# Patient Record
Sex: FEMALE | Race: WHITE | Employment: OTHER | URBAN - METROPOLITAN AREA
[De-identification: names, ages, dates, MRNs, and addresses within clinical notes are randomized per-mention and may not be internally consistent; named-entity substitution may affect disease eponyms.]

---

## 2017-01-18 ENCOUNTER — HOSPITAL ENCOUNTER (INPATIENT)
Facility: HOSPITAL | Age: 82
LOS: 20 days | Discharge: NURSING, REHAB OR LONG-TERM CARE | DRG: 291 | End: 2017-02-07
Attending: EMERGENCY MEDICINE | Admitting: INTERNAL MEDICINE
Payer: MEDICARE

## 2017-01-18 ENCOUNTER — APPOINTMENT (OUTPATIENT)
Dept: GENERAL RADIOLOGY | Facility: HOSPITAL | Age: 82
DRG: 291 | End: 2017-01-18
Attending: EMERGENCY MEDICINE
Payer: MEDICARE

## 2017-01-18 DIAGNOSIS — R00.1 BRADYCARDIA: ICD-10-CM

## 2017-01-18 DIAGNOSIS — R09.02 HYPOXIA: ICD-10-CM

## 2017-01-18 DIAGNOSIS — I48.19 PERSISTENT ATRIAL FIBRILLATION (HCC): ICD-10-CM

## 2017-01-18 DIAGNOSIS — I50.9 ACUTE ON CHRONIC CONGESTIVE HEART FAILURE, UNSPECIFIED CONGESTIVE HEART FAILURE TYPE: Primary | ICD-10-CM

## 2017-01-18 PROBLEM — Z91.81 AT RISK FOR FALLING: Status: ACTIVE | Noted: 2017-01-18

## 2017-01-18 PROBLEM — E03.9 HYPOTHYROID: Chronic | Status: ACTIVE | Noted: 2017-01-18

## 2017-01-18 PROBLEM — I10 UNSPECIFIED ESSENTIAL HYPERTENSION: Chronic | Status: ACTIVE | Noted: 2017-01-18

## 2017-01-18 LAB
ALBUMIN SERPL-MCNC: 4.1 G/DL (ref 3.5–4.8)
ALP LIVER SERPL-CCNC: 95 U/L (ref 55–142)
ALT SERPL-CCNC: 20 U/L (ref 14–54)
APTT PPP: 32 SECONDS (ref 25–34)
AST SERPL-CCNC: 22 U/L (ref 15–41)
ATRIAL RATE: 51 BPM
BASOPHILS # BLD AUTO: 0.01 X10(3) UL (ref 0–0.1)
BASOPHILS NFR BLD AUTO: 0.1 %
BILIRUB SERPL-MCNC: 0.4 MG/DL (ref 0.1–2)
BILIRUB UR QL STRIP.AUTO: NEGATIVE
BUN BLD-MCNC: 65 MG/DL (ref 8–20)
CALCIUM BLD-MCNC: 10.2 MG/DL (ref 8.3–10.3)
CHLORIDE: 105 MMOL/L (ref 101–111)
CLARITY UR REFRACT.AUTO: CLEAR
CO2: 25 MMOL/L (ref 22–32)
COLOR UR AUTO: YELLOW
CREAT BLD-MCNC: 2.99 MG/DL (ref 0.55–1.02)
EOSINOPHIL # BLD AUTO: 0.03 X10(3) UL (ref 0–0.3)
EOSINOPHIL NFR BLD AUTO: 0.4 %
ERYTHROCYTE [DISTWIDTH] IN BLOOD BY AUTOMATED COUNT: 13.6 % (ref 11.5–16)
GLUCOSE BLD-MCNC: 135 MG/DL (ref 70–99)
GLUCOSE UR STRIP.AUTO-MCNC: NEGATIVE MG/DL
HCT VFR BLD AUTO: 46 % (ref 34–50)
HGB BLD-MCNC: 14.7 G/DL (ref 12–16)
HYALINE CASTS #/AREA URNS AUTO: PRESENT /LPF
IMMATURE GRANULOCYTE COUNT: 0.03 X10(3) UL (ref 0–1)
IMMATURE GRANULOCYTE RATIO %: 0.4 %
INR BLD: 0.9 (ref 0.89–1.12)
KETONES UR STRIP.AUTO-MCNC: NEGATIVE MG/DL
LEUKOCYTE ESTERASE UR QL STRIP.AUTO: NEGATIVE
LYMPHOCYTES # BLD AUTO: 0.74 X10(3) UL (ref 0.9–4)
LYMPHOCYTES NFR BLD AUTO: 10.2 %
M PROTEIN MFR SERPL ELPH: 8.1 G/DL (ref 6.1–8.3)
MCH RBC QN AUTO: 36.6 PG (ref 27–33.2)
MCHC RBC AUTO-ENTMCNC: 32 G/DL (ref 31–37)
MCV RBC AUTO: 114.4 FL (ref 81–100)
MONOCYTES # BLD AUTO: 0.42 X10(3) UL (ref 0.1–0.6)
MONOCYTES NFR BLD AUTO: 5.8 %
NEUTROPHIL ABS PRELIM: 6.02 X10 (3) UL (ref 1.3–6.7)
NEUTROPHILS # BLD AUTO: 6.02 X10(3) UL (ref 1.3–6.7)
NEUTROPHILS NFR BLD AUTO: 83.1 %
NITRITE UR QL STRIP.AUTO: NEGATIVE
P AXIS: 63 DEGREES
P-R INTERVAL: 206 MS
PH UR STRIP.AUTO: 5 [PH] (ref 4.5–8)
PLATELET # BLD AUTO: 210 10(3)UL (ref 150–450)
POTASSIUM SERPL-SCNC: 4.7 MMOL/L (ref 3.6–5.1)
PRO-BETA NATRIURETIC PEPTIDE: ABNORMAL PG/ML (ref ?–450)
PROT UR STRIP.AUTO-MCNC: 100 MG/DL
PSA SERPL DL<=0.01 NG/ML-MCNC: 12.4 SECONDS (ref 12.3–14.8)
Q-T INTERVAL: 432 MS
QRS DURATION: 98 MS
QTC CALCULATION (BEZET): 398 MS
R AXIS: -5 DEGREES
RBC # BLD AUTO: 4.02 X10(6)UL (ref 3.8–5.1)
RBC UR QL AUTO: NEGATIVE
RED CELL DISTRIBUTION WIDTH-SD: 58.7 FL (ref 35.1–46.3)
SODIUM SERPL-SCNC: 140 MMOL/L (ref 136–144)
SP GR UR STRIP.AUTO: 1.02 (ref 1–1.03)
T AXIS: 57 DEGREES
TROPONIN: <0.046 NG/ML (ref ?–0.05)
TSI SER-ACNC: 4.88 MIU/ML (ref 0.35–5.5)
UROBILINOGEN UR STRIP.AUTO-MCNC: <2 MG/DL
VENTRICULAR RATE: 51 BPM
WBC # BLD AUTO: 7.3 X10(3) UL (ref 4–13)

## 2017-01-18 PROCEDURE — 99223 1ST HOSP IP/OBS HIGH 75: CPT | Performed by: INTERNAL MEDICINE

## 2017-01-18 PROCEDURE — 71010 XR CHEST AP PORTABLE  (CPT=71010): CPT

## 2017-01-18 RX ORDER — LEVOTHYROXINE SODIUM 0.1 MG/1
100 TABLET ORAL NIGHTLY
Status: DISCONTINUED | OUTPATIENT
Start: 2017-01-18 | End: 2017-02-07

## 2017-01-18 RX ORDER — ATORVASTATIN CALCIUM 10 MG/1
10 TABLET, FILM COATED ORAL EVERY EVENING
Status: DISCONTINUED | OUTPATIENT
Start: 2017-01-18 | End: 2017-02-07

## 2017-01-18 RX ORDER — TORSEMIDE 20 MG/1
20 TABLET ORAL DAILY
Refills: 2 | Status: ON HOLD | COMMUNITY
Start: 2017-01-09 | End: 2017-02-07

## 2017-01-18 RX ORDER — SIMVASTATIN 20 MG
20 TABLET ORAL EVERY EVENING
COMMUNITY

## 2017-01-18 RX ORDER — PREDNISOLONE ACETATE 10 MG/ML
1 SUSPENSION/ DROPS OPHTHALMIC DAILY
Status: DISCONTINUED | OUTPATIENT
Start: 2017-01-18 | End: 2017-02-07

## 2017-01-18 RX ORDER — IRON ASPGLY/C/B12/FA/CA-TH/SUC 70-150-1MG
1 TABLET ORAL NIGHTLY
COMMUNITY
End: 2017-04-05 | Stop reason: ALTCHOICE

## 2017-01-18 RX ORDER — ATENOLOL 25 MG/1
25 TABLET ORAL EVERY EVENING
Status: DISCONTINUED | OUTPATIENT
Start: 2017-01-18 | End: 2017-01-19

## 2017-01-18 RX ORDER — FUROSEMIDE 10 MG/ML
20 INJECTION INTRAMUSCULAR; INTRAVENOUS ONCE
Status: DISCONTINUED | OUTPATIENT
Start: 2017-01-19 | End: 2017-01-19

## 2017-01-18 RX ORDER — TORSEMIDE 20 MG/1
20 TABLET ORAL DAILY
Status: DISCONTINUED | OUTPATIENT
Start: 2017-01-18 | End: 2017-01-19

## 2017-01-18 RX ORDER — IRON ASPGLY/C/B12/FA/CA-TH/SUC 70-150-1MG
1 TABLET ORAL 2 TIMES DAILY
Status: DISCONTINUED | OUTPATIENT
Start: 2017-01-18 | End: 2017-01-19

## 2017-01-18 RX ORDER — FUROSEMIDE 10 MG/ML
40 INJECTION INTRAMUSCULAR; INTRAVENOUS ONCE
Status: COMPLETED | OUTPATIENT
Start: 2017-01-18 | End: 2017-01-18

## 2017-01-18 RX ORDER — AMLODIPINE BESYLATE 5 MG/1
5 TABLET ORAL EVERY EVENING
Status: ON HOLD | COMMUNITY
End: 2017-02-02

## 2017-01-18 RX ORDER — HYDRALAZINE HYDROCHLORIDE 25 MG/1
25 TABLET, FILM COATED ORAL EVERY 8 HOURS SCHEDULED
Status: DISCONTINUED | OUTPATIENT
Start: 2017-01-18 | End: 2017-01-29

## 2017-01-18 RX ORDER — HYDRALAZINE HYDROCHLORIDE 20 MG/ML
10 INJECTION INTRAMUSCULAR; INTRAVENOUS EVERY 4 HOURS PRN
Status: DISCONTINUED | OUTPATIENT
Start: 2017-01-18 | End: 2017-02-07

## 2017-01-18 RX ORDER — HEPARIN SODIUM 5000 [USP'U]/ML
5000 INJECTION, SOLUTION INTRAVENOUS; SUBCUTANEOUS EVERY 12 HOURS
Status: DISCONTINUED | OUTPATIENT
Start: 2017-01-18 | End: 2017-01-21

## 2017-01-18 RX ORDER — AMLODIPINE BESYLATE 5 MG/1
5 TABLET ORAL EVERY EVENING
Status: DISCONTINUED | OUTPATIENT
Start: 2017-01-18 | End: 2017-01-29

## 2017-01-18 RX ORDER — ASCORBIC ACID, THIAMINE, RIBOFLAVIN, NIACINAMIDE, PYRIDOXINE, FOLIC ACID, COBALAMIN, BIOTIN, PANTOTHENIC ACID 100; 1.5; 1.7; 20; 10; 1; 6; 300; 1 MG/1; MG/1; MG/1; MG/1; MG/1; MG/1; UG/1; UG/1; MG/1
1 TABLET, COATED ORAL DAILY
Status: DISCONTINUED | OUTPATIENT
Start: 2017-01-19 | End: 2017-02-07

## 2017-01-18 RX ORDER — PREDNISOLONE ACETATE 10 MG/ML
1 SUSPENSION/ DROPS OPHTHALMIC DAILY
COMMUNITY

## 2017-01-18 RX ORDER — PANTOPRAZOLE SODIUM 40 MG/1
40 TABLET, DELAYED RELEASE ORAL
Status: DISCONTINUED | OUTPATIENT
Start: 2017-01-19 | End: 2017-02-07

## 2017-01-18 RX ORDER — ACETAMINOPHEN / DIPHENHYDRAMINE 25; 500 MG/1; MG/1
2 TABLET ORAL NIGHTLY
COMMUNITY

## 2017-01-18 RX ORDER — LEVOTHYROXINE SODIUM 0.1 MG/1
100 TABLET ORAL NIGHTLY
COMMUNITY
End: 2017-05-07

## 2017-01-18 NOTE — ED PROVIDER NOTES
Patient Seen in: [facility] Emergency Department    History   Patient presents with:  Dyspnea ALEXIS SOB (respiratory)  Poor Feed Anorexia (constitutional)    Stated Complaint: alexis. no appetite.      HPI    45-year-old female presents emergency room wit (NORVASC) 5 MG Oral Tab,  Take 1 tablet by mouth daily. Multiple Vitamins-Minerals (MULTI VITAMIN/MINERALS) Oral Tab,  Take  by mouth daily. acetaminophen (TYLENOL) 325 MG Oral Tab,  Take 650 mg by mouth every 6 (six) hours as needed.    polyethylene gl are moist, oropharynx is clear, uvula midline. Scalp is atraumatic. NECK: Neck is supple, there is no nuchal rigidity. No carotid bruits. No masses. Trachea midline. No cervical lymphadenopathy. HEART: Regular rate and rhythm, no murmurs.   LUNGS: R CBC W/ DIFFERENTIAL[[id number]]          Abnormal            Final result                 Please view results for these tests on the individual orders.    URINALYSIS WITH CULTURE REFLEX   RAINBOW DRAW BLUE   RAINBOW DRAW GOLD   RAINBOW DRAW LAVENDER   GIOVANNI

## 2017-01-18 NOTE — CONSULTS
[facility]  Report of Consultation    Marsha Tompkins Patient Status:  Inpatient    3/22/1920 MRN [id number]   [facility] 2NE-A Attending Brandee Becerra [facility] Day # 0 PCP Edouard Kahn MD     Reason for Consultation: ed facility-administered medications for this encounter. Review of Systems:  All systems were reviewed and are negative except as described above in HPI.     Physical Exam:  Blood pressure 191/68, pulse 51, temperature 97.9 °F (36.6 °C), temperature source sodium and fluid. Await echo to evaluate LVSF. Will start IV lasix and continue conservative medical management and comfort care in this frail and elderly lady.  Dr. Damaris Almonte has seen in the past. Hopefully she can tolerate IV diuretics without further incre

## 2017-01-18 NOTE — H&P
[facility]                                                               History & Physical         Elijah Yi Patient Status:  Emergency    3/22/1920 MRN [id number]   Location [address] [facility] Attending Anuj Irene DO APPENDECTOMY      HEMORRHOIDECTOMY      OTHER SURGICAL HISTORY      Comment KNEE REPLACEMENTS X2    OTHER SURGICAL HISTORY      Comment R ARM FRACTURE - METAL PLATE    OTHER SURGICAL HISTORY  1/2014    Comment R ankle fx       Family history:  Family Histo 01/18/2017   CO2 25.0 01/18/2017    01/18/2017   CA 10.2 01/18/2017   ALB 4.1 01/18/2017   ALKPHO 95 01/18/2017   BILT 0.4 01/18/2017   TP 8.1 01/18/2017   AST 22 01/18/2017   ALT 20 01/18/2017   PTT 32.0 01/18/2017   INR 0.90 01/18/2017   PTP 12.4

## 2017-01-18 NOTE — ED INITIAL ASSESSMENT (HPI)
Increasing in fatigue and lack of appetite for approximately two weeks, intermittent confusion, difficulty in breathing. Denies difficulty with urination. Denies productive cough. Denies fevers at home.

## 2017-01-19 ENCOUNTER — APPOINTMENT (OUTPATIENT)
Dept: ULTRASOUND IMAGING | Facility: HOSPITAL | Age: 82
DRG: 291 | End: 2017-01-19
Attending: INTERNAL MEDICINE
Payer: MEDICARE

## 2017-01-19 ENCOUNTER — APPOINTMENT (OUTPATIENT)
Dept: CV DIAGNOSTICS | Facility: HOSPITAL | Age: 82
DRG: 291 | End: 2017-01-19
Attending: INTERNAL MEDICINE
Payer: MEDICARE

## 2017-01-19 LAB
BASOPHILS # BLD AUTO: 0.02 X10(3) UL (ref 0–0.1)
BASOPHILS NFR BLD AUTO: 0.4 %
BUN BLD-MCNC: 68 MG/DL (ref 8–20)
CALCIUM BLD-MCNC: 9.8 MG/DL (ref 8.3–10.3)
CHLORIDE: 108 MMOL/L (ref 101–111)
CO2: 28 MMOL/L (ref 22–32)
CREAT BLD-MCNC: 2.79 MG/DL (ref 0.55–1.02)
CREAT UR-SCNC: 101 MG/DL
EOSINOPHIL # BLD AUTO: 0.05 X10(3) UL (ref 0–0.3)
EOSINOPHIL NFR BLD AUTO: 1 %
ERYTHROCYTE [DISTWIDTH] IN BLOOD BY AUTOMATED COUNT: 13.7 % (ref 11.5–16)
FOLATE (FOLIC ACID), SERUM: 85.3 NG/ML (ref 8.7–24)
GLUCOSE BLD-MCNC: 103 MG/DL (ref 70–99)
HAV AB SERPL IA-ACNC: 1267 PG/ML (ref 193–986)
HAV IGM SER QL: 2.7 MG/DL (ref 1.7–3)
HCT VFR BLD AUTO: 40.5 % (ref 34–50)
HGB BLD-MCNC: 12.6 G/DL (ref 12–16)
IMMATURE GRANULOCYTE COUNT: 0.02 X10(3) UL (ref 0–1)
IMMATURE GRANULOCYTE RATIO %: 0.4 %
LYMPHOCYTES # BLD AUTO: 0.5 X10(3) UL (ref 0.9–4)
LYMPHOCYTES NFR BLD AUTO: 10 %
MCH RBC QN AUTO: 36 PG (ref 27–33.2)
MCHC RBC AUTO-ENTMCNC: 31.1 G/DL (ref 31–37)
MCV RBC AUTO: 115.7 FL (ref 81–100)
MICROALBUMIN UR-MCNC: 30.2 MG/DL
MICROALBUMIN/CREAT 24H UR-RTO: 299 UG/MG (ref ?–30)
MONOCYTES # BLD AUTO: 0.42 X10(3) UL (ref 0.1–0.6)
MONOCYTES NFR BLD AUTO: 8.4 %
NEUTROPHIL ABS PRELIM: 4 X10 (3) UL (ref 1.3–6.7)
NEUTROPHILS # BLD AUTO: 4 X10(3) UL (ref 1.3–6.7)
NEUTROPHILS NFR BLD AUTO: 79.8 %
PLATELET # BLD AUTO: 178 10(3)UL (ref 150–450)
POTASSIUM SERPL-SCNC: 4.6 MMOL/L (ref 3.6–5.1)
RBC # BLD AUTO: 3.5 X10(6)UL (ref 3.8–5.1)
RED CELL DISTRIBUTION WIDTH-SD: 58.5 FL (ref 35.1–46.3)
SODIUM SERPL-SCNC: 143 MMOL/L (ref 136–144)
WBC # BLD AUTO: 5 X10(3) UL (ref 4–13)

## 2017-01-19 PROCEDURE — 99232 SBSQ HOSP IP/OBS MODERATE 35: CPT | Performed by: HOSPITALIST

## 2017-01-19 PROCEDURE — 93306 TTE W/DOPPLER COMPLETE: CPT

## 2017-01-19 PROCEDURE — 99222 1ST HOSP IP/OBS MODERATE 55: CPT | Performed by: INTERNAL MEDICINE

## 2017-01-19 PROCEDURE — 76770 US EXAM ABDO BACK WALL COMP: CPT

## 2017-01-19 PROCEDURE — 93306 TTE W/DOPPLER COMPLETE: CPT | Performed by: INTERNAL MEDICINE

## 2017-01-19 RX ORDER — IRON ASPGLY/C/B12/FA/CA-TH/SUC 70-150-1MG
1 TABLET ORAL NIGHTLY
Status: DISCONTINUED | OUTPATIENT
Start: 2017-01-20 | End: 2017-01-23

## 2017-01-19 RX ORDER — FUROSEMIDE 10 MG/ML
40 INJECTION INTRAMUSCULAR; INTRAVENOUS
Status: DISCONTINUED | OUTPATIENT
Start: 2017-01-19 | End: 2017-01-20

## 2017-01-19 RX ORDER — FUROSEMIDE 10 MG/ML
40 INJECTION INTRAMUSCULAR; INTRAVENOUS ONCE
Status: COMPLETED | OUTPATIENT
Start: 2017-01-19 | End: 2017-01-19

## 2017-01-19 NOTE — PLAN OF CARE
Problem: DISCHARGE PLANNING  Goal: Discharge to home or other facility with appropriate resources  INTERVENTIONS:  - Identify barriers to discharge w/pt and caregiver  - Include patient/family/discharge partner in discharge planning  - Arrange for needed d

## 2017-01-19 NOTE — CM/SW NOTE
01/19/17 1200   CM/SW Referral Data   Referral Source Physician;Nurse;Social Work (self-referral)   Reason for Referral Discharge planning   Informant Patient; Children  (Daughter/Caregiver/Ann Cormier)   Pertinent Medical Hx   Primary Care Physician Na

## 2017-01-19 NOTE — PROGRESS NOTES
[address] Cardiology Progress Note    Kaiser Flores Patient Status:  Inpatient    3/22/1920 MRN [id number]   [facility] 2NE-A Attending Alton Clark, [address] [facility] [address] Day # 1 PCP Kamila Pineda MD     Subjective:  She has (Porcine)  5,000 Units Subcutaneous Q12H   • hydrALAzine HCl  25 mg Oral Q8H [address]   • MULTIGEN FOLIC  1 tablet Oral BID       Physical Exam:  General:  Alert, in no apparent distress  Neck:  +9cm JVD  Cardiac:  S1, S2, regular  Lungs:   Diminished at the ba

## 2017-01-19 NOTE — DIETARY NOTE
Nutrition Short Note    Dietitian consult received for heart failure education. Pt asleep during attempted visits, no family at bedside, handout left at bedside. RD available PRN.     Analisa Mcintyre RD, LDN

## 2017-01-19 NOTE — PROGRESS NOTES
[facility]  Progress Note      Patient Status:  Inpatient    3/22/1920 MRN [id number]   [facility] 2NE-A Attending Raul Vo, [address] [facility] Day # 1 PCP Naveen Walsh MD     Chief Complaint: SOB/cough    S: Patient • Heparin Sodium (Porcine)  5,000 Units Subcutaneous Q12H   • hydrALAzine HCl  25 mg Oral Q8H [address]   • MULTIGEN FOLIC  1 tablet Oral BID       ASSESSMENT / PLAN:     1. Acute on chronic diastolic heart failure  1. Continue IV diuresis  2. Follow I/O's  3.

## 2017-01-19 NOTE — PROGRESS NOTES
01/19/17 0611 01/19/17 0613 01/19/17 0616   Vital Signs   Pulse (!) 48 (!) 49 (!) 47   Heart Rate Source Monitor --  --    Resp 20 --  --    Respiratory Quality Normal --  --    /47 mmHg 151/58 mmHg 151/58 mmHg   BP Location Left arm Left arm Left

## 2017-01-19 NOTE — CONSULTS
[facility]  Report of Consultation    Philbert Canavan Patient Status:  Inpatient    3/22/1920 MRN [id number]   [place] 2NE-A Attending Melvin Joyce, [address] Day # 1 PCP Peggy Rinaldi MD       Assessment / Plan:    1) CKD 4- l • Anemia    • Hypothyroid    • Visual impairment    • Hearing impairment    • Kidney stone    • Bacteremia    • Pneumonia      1 1/2 years ago. Hospitalized.     • CKD (chronic kidney disease)    • High cholesterol    • High blood pressure    • Disorder o mg, 10 mg, Intravenous, Q4H PRN  •  hydrALAzine HCl (APRESOLINE) tab 25 mg, 25 mg, Oral, Q8H [place]  •  MULTIGEN FOLIC -1-5 MG TABS 1 tablet, 1 tablet, Oral, BID    No current outpatient prescriptions on file.     Review of Systems:  Please see HPI for p to participate in the care of your patient.     Priyanka Zepeda  1/19/2017  7:54 AM

## 2017-01-19 NOTE — PROGRESS NOTES
01/19/17 0611 01/19/17 0613 01/19/17 0616   Vital Signs   Pulse (!) 48 (!) 49 (!) 47   Heart Rate Source Monitor --  --    Resp 20 --  --    Respiratory Quality Normal --  --    /47 mmHg 151/58 mmHg 135/48 mmHg   BP Location Left arm Left arm Left

## 2017-01-19 NOTE — PHYSICAL THERAPY NOTE
PHYSICAL THERAPY EVALUATION - INPATIENT     Room Number: 8009/7935-Y  Evaluation Date: 1/19/2017  Type of Evaluation: Initial  Physician Order: PT Eval and Treat    Presenting Problem: acute on chronic CHF  Reason for Therapy: Mobility Dysfunction and is able to dress herself, but dtr assists. Per dtr, pt ind with showering, has shower chair which pt does not use, has grab bars in shower. Dtr reports that they do exercises daily.     SUBJECTIVE  \"I feel better now\"     Patient self-stated goal is to g a railing?: A Little     AM-PAC Score:  Raw Score: 21   PT Approx Degree of Impairment Score: 28.97%   Standardized Score (AM-PAC Scale): 50.25   CMS Modifier (G-Code): CJ    FUNCTIONAL ABILITY STATUS  Gait Assessment   Gait Assistance: Minimum assistance RECOMMENDATIONS  PT Discharge Recommendations: Home (with supportive daughter)    PLAN  PT Treatment Plan: Balance training;Transfer training;Gait training;Strengthening; Endurance; Energy conservation  Rehab Potential : Good  Frequency (Obs): 5x/week  [place]

## 2017-01-19 NOTE — PROGRESS NOTES
CARDIOVASCULAR - ADULT      •  Maintains optimal cardiac output and hemodynamic stability  Progressing      •  Absence of cardiac arrhythmias or at baseline  Progressing            Impaired Activities of Daily Living      •  Achieve highest/safest level of

## 2017-01-19 NOTE — PROGRESS NOTES
Rec'd patient at 1600, Alert and oriented x2-3. Daughter at bedside. Updated on plan of care. Up with one assist and walker, no home O2, uses 2L NC and C/O SOB with activity. Bilateral lower ext edema L>R. US of kidney and bladder done.

## 2017-01-20 LAB
ALLENS TEST: POSITIVE
AMMONIA: 25 UMOL/L (ref 11–32)
ARTERIAL BLD GAS O2 SATURATION: 87 % (ref 92–100)
ARTERIAL BLOOD GAS BASE EXCESS: -1.5
ARTERIAL BLOOD GAS HCO3: 25.8 MEQ/L (ref 22–26)
ARTERIAL BLOOD GAS PCO2: 53 MM HG (ref 35–45)
ARTERIAL BLOOD GAS PH: 7.3 (ref 7.35–7.45)
ARTERIAL BLOOD GAS PO2: 50 MM HG (ref 80–105)
BUN BLD-MCNC: 74 MG/DL (ref 8–20)
CALCIUM BLD-MCNC: 9.8 MG/DL (ref 8.3–10.3)
CALCULATED O2 SATURATION: 81 % (ref 92–100)
CARBOXYHEMOGLOBIN: 1 % SAT (ref 0–3)
CHLORIDE: 106 MMOL/L (ref 101–111)
CO2: 23 MMOL/L (ref 22–32)
CREAT BLD-MCNC: 3.02 MG/DL (ref 0.55–1.02)
GLUCOSE BLD-MCNC: 117 MG/DL (ref 70–99)
L/M: 4 L/MIN
METHEMOGLOBIN: 0.6 % SAT (ref 0.4–1.5)
PATIENT TEMPERATURE: 97.6 F
POTASSIUM SERPL-SCNC: 5.1 MMOL/L (ref 3.6–5.1)
PROCALCITONIN SERPL-MCNC: <0.11 NG/ML (ref ?–0.11)
SODIUM SERPL-SCNC: 142 MMOL/L (ref 136–144)
TOTAL HEMOGLOBIN: 13.6 G/DL (ref 11.7–16)

## 2017-01-20 PROCEDURE — 5A09557 ASSISTANCE WITH RESPIRATORY VENTILATION, GREATER THAN 96 CONSECUTIVE HOURS, CONTINUOUS POSITIVE AIRWAY PRESSURE: ICD-10-PCS | Performed by: INTERNAL MEDICINE

## 2017-01-20 PROCEDURE — 99232 SBSQ HOSP IP/OBS MODERATE 35: CPT | Performed by: HOSPITALIST

## 2017-01-20 PROCEDURE — 99232 SBSQ HOSP IP/OBS MODERATE 35: CPT | Performed by: INTERNAL MEDICINE

## 2017-01-20 RX ORDER — FUROSEMIDE 10 MG/ML
40 INJECTION INTRAMUSCULAR; INTRAVENOUS
Status: DISCONTINUED | OUTPATIENT
Start: 2017-01-20 | End: 2017-01-22

## 2017-01-20 NOTE — PROGRESS NOTES
[address] Cardiology Progress Note    Yolette Hickey Patient Status:  Inpatient    3/22/1920 MRN [id number]   [facility] 2NE-A Attending Lester Cortez, [address] [facility] [address] Day # 2 PCP Omer Mendoza MD     Subjective:  She was Subcutaneous Q12H   • hydrALAzine HCl  25 mg Oral Q8H [address]     Echo 1/20/17:  Conclusions:  1.  Left ventricle:  There was sinus bradycardia with rates varying from 33 -      60 bpm. The cavity size was normal. Wall thickness was normal. Systolic      functi Hg.  11. Pericardium, extracardiac: There was a left pleural effusion.   Impressions:  This study is compared with previous dated 06/10/2013:  Compared to the previous study, resting heart rate has changed from normal  sinus at ~ 72 bpm to sinus bradycardia

## 2017-01-20 NOTE — CONSULTS
Michael Wolfe [address] [facility] [address] Note [facility]  Report of Consultation    Elijah Yi Patient Status:  Inpatient    3/22/1920 MRN [id number]   [facility] 2NE-A Attending Isidra Ramirez furosemide  40 mg Intravenous BID (Diuretic)   • MULTIGEN FOLIC  1 tablet Oral Nightly   • AmLODIPine Besylate  5 mg Oral QPM   • Levothyroxine Sodium  100 mcg Oral Nightly   • multivitamin  1 tablet Oral Daily   • Pantoprazole Sodium  40 mg Oral QAM AC (36.6 °C) Oral 63 18 (!) 88 % -   01/19/17 2137 153/42 mmHg 98 °F (36.7 °C) Oral 53 18 92 % -         Intake/Output:  [unfilled]  @IOTHIS SHIFT@         PHYSICAL EXAM  GEN: Appears comfrotable.  No acute distress  PSYCH: Normal mood and affect, AAOX3  N result)   Collection Time: 01/18/17  3:49 PM   Result Value Ref Range   Blood Culture Result No Growth 2 Days N/A     Recent Labs   Lab  01/18/17   1611   COLORUR  Yellow   CLARITY  Clear   SPECGRAVITY  1.017   GLUUR  Negative   BILUR  Negative   KETUR  Ne

## 2017-01-20 NOTE — PROGRESS NOTES
[facility]  Progress Note     Lindsay Nicholson Patient Status:  Inpatient    3/22/1920 MRN [id number]   [facility] 2NE-A Attending Bushra Chua, [address] Day # 2 PCP Ha Reeder MD     Chief Complaint: SOB/cough    S: Patient Daily   • Pantoprazole Sodium  40 mg Oral QAM AC   • Atorvastatin Calcium  10 mg Oral QPM   • prednisoLONE acetate  1 drop Right Eye Daily   • Heparin Sodium (Porcine)  5,000 Units Subcutaneous Q12H   • hydrALAzine HCl  25 mg Oral Q8H [address]       ASSESSMENT

## 2017-01-20 NOTE — PROGRESS NOTES
[facility]  Nephrology Progress Note    Joslyn Luna Attending:  Naty Cardenas,        Assessment and Plan:  1) CKD 4- longstanding, presumably due to hypertensive and age-related nephrosclerosis with baseline Cr approx 2.5 mg/dl x yrs- relative 9.8 01/20/2017       Imaging: All imaging studies reviewed.     Meds:     Current Facility-Administered Medications:  MULTIGEN FOLIC -6-6 MG TABS 1 tablet 1 tablet Oral Nightly   influenza vaccine (PF)(FLUZONE) high dose for 65 yrs & older nj 0.5ml 0

## 2017-01-21 ENCOUNTER — APPOINTMENT (OUTPATIENT)
Dept: GENERAL RADIOLOGY | Facility: HOSPITAL | Age: 82
DRG: 291 | End: 2017-01-21
Attending: HOSPITALIST
Payer: MEDICARE

## 2017-01-21 LAB
ALLENS TEST: POSITIVE
ARTERIAL BLD GAS O2 SATURATION: 94 % (ref 92–100)
ARTERIAL BLOOD GAS BASE EXCESS: -0.2
ARTERIAL BLOOD GAS HCO3: 26.2 MEQ/L (ref 22–26)
ARTERIAL BLOOD GAS PCO2: 50 MM HG (ref 35–45)
ARTERIAL BLOOD GAS PH: 7.34 (ref 7.35–7.45)
ARTERIAL BLOOD GAS PO2: 79 MM HG (ref 80–105)
BILIRUB UR QL STRIP.AUTO: NEGATIVE
BUN BLD-MCNC: 80 MG/DL (ref 8–20)
CALCIUM BLD-MCNC: 9.8 MG/DL (ref 8.3–10.3)
CALCULATED O2 SATURATION: 95 % (ref 92–100)
CARBOXYHEMOGLOBIN: 0.9 % SAT (ref 0–3)
CHLORIDE: 106 MMOL/L (ref 101–111)
CLARITY UR REFRACT.AUTO: CLEAR
CO2: 28 MMOL/L (ref 22–32)
COLOR UR AUTO: YELLOW
CREAT BLD-MCNC: 3.09 MG/DL (ref 0.55–1.02)
EXPIRATORY PRESSURE: 5 CM H2O
FIO2: 45 %
GLUCOSE BLD-MCNC: 94 MG/DL (ref 70–99)
GLUCOSE UR STRIP.AUTO-MCNC: NEGATIVE MG/DL
HAV IGM SER QL: 2.7 MG/DL (ref 1.7–3)
HYALINE CASTS #/AREA URNS AUTO: PRESENT /LPF
INSP PRESSURE: 10 CM H2O
KETONES UR STRIP.AUTO-MCNC: NEGATIVE MG/DL
METHEMOGLOBIN: 0.6 % SAT (ref 0.4–1.5)
NITRITE UR QL STRIP.AUTO: NEGATIVE
PATIENT TEMPERATURE: 98.6 F
PH UR STRIP.AUTO: 5 [PH] (ref 4.5–8)
POTASSIUM SERPL-SCNC: 4.8 MMOL/L (ref 3.6–5.1)
PROT UR STRIP.AUTO-MCNC: 30 MG/DL
RBC UR QL AUTO: NEGATIVE
SODIUM SERPL-SCNC: 141 MMOL/L (ref 136–144)
SP GR UR STRIP.AUTO: 1.02 (ref 1–1.03)
TOTAL HEMOGLOBIN: 12.5 G/DL (ref 11.7–16)
UROBILINOGEN UR STRIP.AUTO-MCNC: <2 MG/DL

## 2017-01-21 PROCEDURE — 99232 SBSQ HOSP IP/OBS MODERATE 35: CPT | Performed by: HOSPITALIST

## 2017-01-21 PROCEDURE — 71010 XR CHEST AP PORTABLE  (CPT=71010): CPT

## 2017-01-21 PROCEDURE — 99232 SBSQ HOSP IP/OBS MODERATE 35: CPT | Performed by: INTERNAL MEDICINE

## 2017-01-21 RX ORDER — HEPARIN SODIUM 5000 [USP'U]/ML
5000 INJECTION, SOLUTION INTRAVENOUS; SUBCUTANEOUS EVERY 12 HOURS
Status: DISCONTINUED | OUTPATIENT
Start: 2017-01-21 | End: 2017-02-03

## 2017-01-21 NOTE — PROGRESS NOTES
[facility]  Progress Note    Julisa Atkins Patient Status:  Inpatient    3/22/1920 MRN [id number]   [facility] 2NE-A Attending Charmaine Kaur, [address] [facility] Day # 3 PCP Giles Reed MD     Subjective:  Julisa Atkins is a(n) 80 yea 2364  01/21/17   0514   GLU  103*  117*  94   BUN  68*  74*  80*   CREATSERUM  2.79*  3.02*  3.09*   CA  9.8  9.8  9.8   NA  143  142  141   K  4.6  5.1  4.8   CL  108  106  106   CO2  28.0  23.0  28.0     Recent Labs   Lab  01/20/17   1406   ABGPHT  7.30*

## 2017-01-21 NOTE — PROGRESS NOTES
[facility]  Progress Note     Sandeep Brand Patient Status:  Inpatient    3/22/1920 MRN [id number]   [facility] 2NE-A Attending Ashlee Kelley, [address] [facility] Day # 3 PCP Sanchez Gordon MD     Chief Complaint: SOB/cough    S: Patient Intravenous BID (Diuretic)   • MULTIGEN FOLIC  1 tablet Oral Nightly   • AmLODIPine Besylate  5 mg Oral QPM   • Levothyroxine Sodium  100 mcg Oral Nightly   • multivitamin  1 tablet Oral Daily   • Pantoprazole Sodium  40 mg Oral QAM AC   • Atorvastatin Srini

## 2017-01-21 NOTE — PROGRESS NOTES
[facility] Cardiology Progress Note  Subjective:  Up in chair, reviewed with daughter. Edema improved, no dyspnea    Objective:  145/50  Afebrile  SR 40-50s   I/O equal     BUN/cr 80/3.09    Neuro:awake/alert. Much more engaged per family.   HEENT: mingo

## 2017-01-21 NOTE — PROGRESS NOTES
[facility]  Nephrology Progress Note    Nayeli Kaufman Patient Status:  Inpatient    3/22/1920 MRN [id number]   [facility] 2NE-A Attending Yajaira Pena, [address] [facility] Day # 3 PCP Yannick Diaz MD       SUBJECTIVE:  Up walking in room Current Facility-Administered Medications:  Heparin Sodium (Porcine) 5000 UNIT/ML injection 5,000 Units 5,000 Units Subcutaneous Q12H   furosemide (LASIX) injection 40 mg 40 mg Intravenous BID (Diuretic)   MULTIGEN FOLIC -7-4 MG TABS 1 tablet 1 t

## 2017-01-21 NOTE — PLAN OF CARE
CARDIOVASCULAR - ADULT    • Maintains optimal cardiac output and hemodynamic stability Progressing    • Absence of cardiac arrhythmias or at baseline Progressing        GENITOURINARY - ADULT    • Absence of urinary retention Progressing        Impaired Act

## 2017-01-22 LAB
BUN BLD-MCNC: 83 MG/DL (ref 8–20)
CALCIUM BLD-MCNC: 9.7 MG/DL (ref 8.3–10.3)
CHLORIDE: 104 MMOL/L (ref 101–111)
CO2: 30 MMOL/L (ref 22–32)
CREAT BLD-MCNC: 3.19 MG/DL (ref 0.55–1.02)
ERYTHROCYTE [DISTWIDTH] IN BLOOD BY AUTOMATED COUNT: 14 % (ref 11.5–16)
GLUCOSE BLD-MCNC: 90 MG/DL (ref 70–99)
HAV IGM SER QL: 2.6 MG/DL (ref 1.7–3)
HCT VFR BLD AUTO: 36.6 % (ref 34–50)
HGB BLD-MCNC: 11.7 G/DL (ref 12–16)
MCH RBC QN AUTO: 36.1 PG (ref 27–33.2)
MCHC RBC AUTO-ENTMCNC: 32 G/DL (ref 31–37)
MCV RBC AUTO: 113 FL (ref 81–100)
PLATELET # BLD AUTO: 155 10(3)UL (ref 150–450)
POTASSIUM SERPL-SCNC: 4.8 MMOL/L (ref 3.6–5.1)
RBC # BLD AUTO: 3.24 X10(6)UL (ref 3.8–5.1)
RED CELL DISTRIBUTION WIDTH-SD: 59.1 FL (ref 35.1–46.3)
SODIUM SERPL-SCNC: 141 MMOL/L (ref 136–144)
WBC # BLD AUTO: 4.9 X10(3) UL (ref 4–13)

## 2017-01-22 PROCEDURE — 99232 SBSQ HOSP IP/OBS MODERATE 35: CPT | Performed by: HOSPITALIST

## 2017-01-22 PROCEDURE — 99232 SBSQ HOSP IP/OBS MODERATE 35: CPT | Performed by: INTERNAL MEDICINE

## 2017-01-22 RX ORDER — FUROSEMIDE 10 MG/ML
80 INJECTION INTRAMUSCULAR; INTRAVENOUS
Status: DISCONTINUED | OUTPATIENT
Start: 2017-01-22 | End: 2017-01-23

## 2017-01-22 NOTE — PROGRESS NOTES
[facility]  Progress Note     Yolette Hickey Patient Status:  Inpatient    3/22/1920 MRN [id number]   [facility] 2NE-A Attending Lester Cortez, [address] [facility] Day # 4 PCP Omer Mendoza MD     Chief Complaint: SOB/cough    S: Patient • hydrALAzine HCl  25 mg Oral Q8H [address]       ASSESSMENT / PLAN:     1. Acute on chronic diastolic heart failure  1. Continue IV diuresis per renal/cardio  2. Follow I/O's  3. Daily weights  4. Echo noted  5. Cardiology following  2.  Hypoxic/hypercapnic re

## 2017-01-22 NOTE — PROGRESS NOTES
[facility] Cardiology Progress Note  Subjective:  Dyspneic just back from bathroom, LE edema persists    Objective:  1135/39   161/52     Afebrile  SB 50s      I/O equal     BUN/cr 83/3.19    Neuro:awake/alert. HEENT: moist mucosa  Neck: no JVD.   Leslie Bunn

## 2017-01-22 NOTE — PROGRESS NOTES
[facility]  Nephrology Progress Note     Patient Status:  Inpatient    3/22/1920 MRN [id number]   [facility] 2NE-A Attending Raul Vo, [address] [facility] Day # 4 PCP Naveen Walsh MD       SUBJECTIVE:  Up walking in room Current Facility-Administered Medications:  Heparin Sodium (Porcine) 5000 UNIT/ML injection 5,000 Units 5,000 Units Subcutaneous Q12H   furosemide (LASIX) injection 40 mg 40 mg Intravenous BID (Diuretic)   MULTIGEN FOLIC -1-7 MG TABS 1 tablet 1 t

## 2017-01-22 NOTE — PLAN OF CARE
Problem: CARDIOVASCULAR - ADULT  Goal: Maintains optimal cardiac output and hemodynamic stability  INTERVENTIONS:  - Monitor vital signs, rhythm, and trends  - Monitor for bleeding, hypotension and signs of decreased cardiac output  - Evaluate effectivenes ADULT  Goal: Electrolytes maintained within normal limits  INTERVENTIONS:  - Monitor labs and rhythm and assess patient for signs and symptoms of electrolyte imbalances  - Administer electrolyte replacement as ordered  - Monitor response to electrolyte rep collaborating with pharmacy to review patient’s medication profile  - Implement strategies to promote bladder emptying   Outcome: Progressing  Voiding with difficulty

## 2017-01-22 NOTE — PROGRESS NOTES
[facility]  Progress Note    Luke Girard Patient Status:  Inpatient    3/22/1920 MRN [id number]   [facility] 2NE-A Attending Lopez Hull, [address] [facility] Day # 4 PCP Fouzia Rogers MD     Subjective:  Luke Girard is a(n 80 yea -0. 2   TEMP  98.6   MILLIE  Positive   SITE  Right Radial   DEV  Bi-PAP   THGB  12.5         Cultures: No new/positive cultures    Radiology: No new films    Medications reviewed.       ASSESSMENT      · Hypoxic, hypercarbic respiratory failure - likely mult

## 2017-01-23 LAB
BUN BLD-MCNC: 82 MG/DL (ref 8–20)
CALCIUM BLD-MCNC: 9.7 MG/DL (ref 8.3–10.3)
CHLORIDE: 104 MMOL/L (ref 101–111)
CO2: 29 MMOL/L (ref 22–32)
CREAT BLD-MCNC: 3.28 MG/DL (ref 0.55–1.02)
GLUCOSE BLD-MCNC: 93 MG/DL (ref 70–99)
POTASSIUM SERPL-SCNC: 4.7 MMOL/L (ref 3.6–5.1)
SODIUM SERPL-SCNC: 140 MMOL/L (ref 136–144)

## 2017-01-23 PROCEDURE — B54NZZA ULTRASONOGRAPHY OF LEFT UPPER EXTREMITY VEINS, GUIDANCE: ICD-10-PCS | Performed by: INTERNAL MEDICINE

## 2017-01-23 PROCEDURE — 05HC33Z INSERTION OF INFUSION DEVICE INTO LEFT BASILIC VEIN, PERCUTANEOUS APPROACH: ICD-10-PCS | Performed by: INTERNAL MEDICINE

## 2017-01-23 PROCEDURE — 99232 SBSQ HOSP IP/OBS MODERATE 35: CPT | Performed by: HOSPITALIST

## 2017-01-23 PROCEDURE — 99232 SBSQ HOSP IP/OBS MODERATE 35: CPT | Performed by: INTERNAL MEDICINE

## 2017-01-23 RX ORDER — IRON ASPGLY/C/B12/FA/CA-TH/SUC 70-150-1MG
1 TABLET ORAL 2 TIMES DAILY
Status: DISCONTINUED | OUTPATIENT
Start: 2017-01-23 | End: 2017-01-23

## 2017-01-23 RX ORDER — IRON ASPGLY/C/B12/FA/CA-TH/SUC 70-150-1MG
1 TABLET ORAL 2 TIMES DAILY
Status: DISCONTINUED | OUTPATIENT
Start: 2017-01-23 | End: 2017-02-07

## 2017-01-23 NOTE — PROGRESS NOTES
[facility] Progress Note  Subjective:  Sleeping. Long discussion with daughter regarding goals of care and quality of life.  Had good weekend per daughter    Objective:  /42 mmHg  Pulse 47  Temp(Src) 96.1 °F (35.6 °C) (Axillary)  Resp 20

## 2017-01-23 NOTE — PHYSICAL THERAPY NOTE
PHYSICAL THERAPY TREATMENT NOTE - INPATIENT    Room Number: 9111/8296-S     Session: 2  Number of Visits to Meet Established Goals: 5    Presenting Problem: acute on chronic CHF    Problem List  Principal Problem:    Acute on chronic congestive heart fail Standing: Poor +    ACTIVITY TOLERANCE  O2 Saturation: 97%  O2 Saturation with activity 91%  O2 Device: Nasal cannula  Liters of O2:  3    AM-PAC '6-Clicks' INPATIENT SHORT FORM - BASIC MOBILITY  How much difficulty does the patient currently have. ..  - Repetitions   10   Sets   2     Patient End of Session: Up in chair;Needs met;Call light within reach;RN aware of session/findings; All patient questions and concerns addressed    ASSESSMENT   Pt with improved endurance since last session.  Pt continues to

## 2017-01-23 NOTE — PROGRESS NOTES
[facility]  Progress Note     Geovany Du Patient Status:  Inpatient    3/22/1920 MRN [id number]   [facility] 2NE-A Attending Harpreet Domingo, [address] [facility] Day # 5 PCP Francine Harrison MD     Chief Complaint: SOB/cough    S: Patient hydrALAzine HCl  25 mg Oral Q8H [address]       ASSESSMENT / PLAN:     1. Acute on chronic diastolic heart failure  1. Continue diuresis per renal/cardio  1. Switched to lasix gtt this AM  2. Follow I/O's  3. Daily weights  4.  Monitor renal function   5. Echo no

## 2017-01-23 NOTE — PROGRESS NOTES
[facility]  Nephrology Progress Note    Kaiser Flores Attending:  Alton Clark,        Assessment and Plan:  1) CKD 4- longstanding, presumably due to hypertensive and age-related nephrosclerosis with baseline Cr approx 2.5 mg/dl x yrs- relative GLU 93 01/23/2017   CA 9.7 01/23/2017       Imaging: All imaging studies reviewed.     Meds:     Current Facility-Administered Medications:  MULTIGEN FOLIC -7-4 MG TABS 1 tablet-Patient supplied 1 tablet Oral BID   furosemide (LASIX) injection 80 m

## 2017-01-23 NOTE — PLAN OF CARE
CARDIOVASCULAR - ADULT    • Maintains optimal cardiac output and hemodynamic stability  Tele showing SB/SR Hr- 50 - 60's Progressing    • Absence of cardiac arrhythmias or at baseline  No dysrhythmia's noted         Progressing        GENITOURINARY - ADULT

## 2017-01-23 NOTE — HOME CARE LIAISON
Order written for home healthcare. Met with patient's daughter and offered choice of agencies as well as list. She is agreeable to [facility], as she had no preference. [facility] will provide RN and PT for this patient. Referral sent to [facility] via [place].

## 2017-01-23 NOTE — PROGRESS NOTES
[facility]  Progress Note    Augustine Pugh Patient Status:  Inpatient    3/22/1920 MRN [id number]   [facility] 2NE-A Attending Vasu Lopes, [address] [facility] Day # 5 PCP Concha June MD     Subjective:  Augustine Pugh is a(n) 80 yea 8260  01/23/17   0627   GLU  94  90  93   BUN  80*  83*  82*   CREATSERUM  3.09*  3.19*  3.28*   CA  9.8  9.7  9.7   NA  141  141  140   K  4.8  4.8  4.7   CL  106  104  104   CO2  28.0  30.0  29.0     Recent Labs   Lab  01/21/17   1617   ABGPHT  7.34*   A

## 2017-01-24 ENCOUNTER — APPOINTMENT (OUTPATIENT)
Dept: GENERAL RADIOLOGY | Facility: HOSPITAL | Age: 82
DRG: 291 | End: 2017-01-24
Attending: INTERNAL MEDICINE
Payer: MEDICARE

## 2017-01-24 LAB
BUN BLD-MCNC: 81 MG/DL (ref 8–20)
CALCIUM BLD-MCNC: 9.8 MG/DL (ref 8.3–10.3)
CHLORIDE: 104 MMOL/L (ref 101–111)
CO2: 28 MMOL/L (ref 22–32)
CREAT BLD-MCNC: 3.21 MG/DL (ref 0.55–1.02)
ERYTHROCYTE [DISTWIDTH] IN BLOOD BY AUTOMATED COUNT: 13.9 % (ref 11.5–16)
GLUCOSE BLD-MCNC: 94 MG/DL (ref 70–99)
HAV IGM SER QL: 2.5 MG/DL (ref 1.7–3)
HAV IGM SER QL: 2.6 MG/DL (ref 1.7–3)
HCT VFR BLD AUTO: 35.3 % (ref 34–50)
HGB BLD-MCNC: 11.4 G/DL (ref 12–16)
MCH RBC QN AUTO: 36.2 PG (ref 27–33.2)
MCHC RBC AUTO-ENTMCNC: 32.3 G/DL (ref 31–37)
MCV RBC AUTO: 112.1 FL (ref 81–100)
PLATELET # BLD AUTO: 143 10(3)UL (ref 150–450)
POTASSIUM SERPL-SCNC: 4.7 MMOL/L (ref 3.6–5.1)
RBC # BLD AUTO: 3.15 X10(6)UL (ref 3.8–5.1)
RED CELL DISTRIBUTION WIDTH-SD: 56.7 FL (ref 35.1–46.3)
SODIUM SERPL-SCNC: 140 MMOL/L (ref 136–144)
WBC # BLD AUTO: 4.8 X10(3) UL (ref 4–13)

## 2017-01-24 PROCEDURE — 99232 SBSQ HOSP IP/OBS MODERATE 35: CPT | Performed by: HOSPITALIST

## 2017-01-24 PROCEDURE — 99233 SBSQ HOSP IP/OBS HIGH 50: CPT | Performed by: INTERNAL MEDICINE

## 2017-01-24 PROCEDURE — 71010 XR CHEST AP PORTABLE  (CPT=71010): CPT

## 2017-01-24 RX ORDER — TRAMADOL HYDROCHLORIDE 50 MG/1
50 TABLET ORAL EVERY 12 HOURS PRN
Status: DISCONTINUED | OUTPATIENT
Start: 2017-01-24 | End: 2017-02-07

## 2017-01-24 NOTE — CONSULTS
[facility] Note:  Renal Dose Adjustment    Frederick Wilkerson has been prescribed tramadol (ULTRAM) 50 mg orally every 8 hours as needed. Estimated Creatinine Clearance: 8.9 mL/min (based on Cr of 3.21).     Her calculated creatinine clearance is <30 ml/

## 2017-01-24 NOTE — PROGRESS NOTES
[facility]  Cardiology Progress Note    Kaiser Flores Patient Status:  Inpatient    3/22/1920 MRN [id number]   [facility] 2NE-A Attending Alton Clark, [address] Day # 6 PCP Kamila Pineda MD     Subjective: Very tired.  Denies ch ill-looking  Neck:  mild JVP  Cardiac:  Regular, 3/6 BIBI  Lungs: fine crackles to right base, diminished BS in the left base  Abdomen:  Non-distended, soft, non-tender, BS+. Extremities:   2+ LE edema bilaterally.   Neuro:   Alert and oriented x 3; moves a

## 2017-01-24 NOTE — PROGRESS NOTES
[facility]  Progress Note    Julisa Atkins Patient Status:  Inpatient    3/22/1920 MRN [id number]   [facility] 2NE-A Attending Charmaine Kaur, [address] [facility] Day # 6 PCP Giles Reed MD     Subjective:  Julisa Atkins is a(n) 80 yea CREATSERUM  3.19*  3.28*  3.21*   CA  9.7  9.7  9.8   NA  141  140  140   K  4.8  4.7  4.7   CL  104  104  104   CO2  30.0  29.0  28.0     Recent Labs   Lab  01/21/17   1617   ABGPHT  7.34*   [id number]  50*   FNVYG8L  79*   ABGHCO3  26.2*   ABGBE  -0.2

## 2017-01-24 NOTE — PLAN OF CARE
Received bedside report on this Pt. At 1910. Pt. Awake, A&O, [place], up to BR with walker and min assist.  SB no Tele monitor, sats greater than 92% on HF NC. Continues on Lasix drip at 10ml/hr, Midline intact and patent with positive blood return noted.   P

## 2017-01-24 NOTE — PROGRESS NOTES
[facility]  Nephrology Progress Note    Geovany Du Attending:  Harpreet Domingo,        Assessment and Plan:  1) CKD 4- longstanding, presumably due to hypertensive and age-related nephrosclerosis with baseline Cr approx 2.5 mg/dl x yrs- relative 01/24/2017   .0 01/24/2017   CREATSERUM 3.21 01/24/2017   BUN 81 01/24/2017    01/24/2017   K 4.7 01/24/2017    01/24/2017   CO2 28.0 01/24/2017   GLU 94 01/24/2017   CA 9.8 01/24/2017   MG 2.6 01/24/2017       Imaging:   All imaging stud

## 2017-01-24 NOTE — PROGRESS NOTES
[facility]  Progress Note     Ericagarry Santillan Patient Status:  Inpatient    3/22/1920 MRN [id number]   [facility] 2NE-A Attending Suni Leigh, [address] [facility] Day # 6 PCP Nguyen Julian MD     Chief Complaint: SOB/cough    S: Patient Atorvastatin Calcium  10 mg Oral QPM   • prednisoLONE acetate  1 drop Right Eye Daily   • hydrALAzine HCl  25 mg Oral Q8H [facility]       ASSESSMENT / PLAN:     1. Acute on chronic diastolic heart failure  1.  Continue diuresis per renal/cardio  1. lasix gtt incr

## 2017-01-25 LAB
BUN BLD-MCNC: 82 MG/DL (ref 8–20)
CALCIUM BLD-MCNC: 9.9 MG/DL (ref 8.3–10.3)
CHLORIDE: 102 MMOL/L (ref 101–111)
CO2: 30 MMOL/L (ref 22–32)
CREAT BLD-MCNC: 3.19 MG/DL (ref 0.55–1.02)
GLUCOSE BLD-MCNC: 93 MG/DL (ref 70–99)
HAV IGM SER QL: 2.4 MG/DL (ref 1.7–3)
POTASSIUM SERPL-SCNC: 4.1 MMOL/L (ref 3.6–5.1)
SODIUM SERPL-SCNC: 140 MMOL/L (ref 136–144)

## 2017-01-25 PROCEDURE — 99232 SBSQ HOSP IP/OBS MODERATE 35: CPT | Performed by: INTERNAL MEDICINE

## 2017-01-25 PROCEDURE — 99232 SBSQ HOSP IP/OBS MODERATE 35: CPT | Performed by: HOSPITALIST

## 2017-01-25 NOTE — PROGRESS NOTES
[facility]  Progress Note     Yolette Hickey Patient Status:  Inpatient    3/22/1920 MRN [id number]   [facility] 2NE-A Attending Lester Cortez, [address] [facility] Day # 7 PCP Omer Mendoza MD     Chief Complaint: SOB/cough    S: Patient Q8H [address]       ASSESSMENT / PLAN:     1. Acute on chronic diastolic heart failure  1. Continue diuresis per renal/cardio  2. Follow I/O's  3. Daily weights  4. Monitor renal function   5. Echo noted  2. Hypoxic/hypercapnic respiratory failure   1.  Likely mu

## 2017-01-25 NOTE — PROGRESS NOTES
[facility]  Nephrology Progress Note    Esther Rice Attending:  Reyna Barrios DO       Assessment and Plan:  1) CKD 4- longstanding, presumably due to hypertensive and age-related nephrosclerosis with baseline Cr approx 2.5 mg/dl x yrs- relative Results  Component Value Date   CREATSERUM 3.19 01/25/2017   BUN 82 01/25/2017    01/25/2017   K 4.1 01/25/2017    01/25/2017   CO2 30.0 01/25/2017   GLU 93 01/25/2017   CA 9.9 01/25/2017   MG 2.4 01/25/2017       Imaging:   All imaging studies

## 2017-01-25 NOTE — PROGRESS NOTES
[facility]  Cardiology Progress Note    Nayeli Kaufman Patient Status:  Inpatient    3/22/1920 MRN [id number]   [facility] 2NE-A Attending Yajaira Pena, [address] [facility] Day # 7 PCP Yannick Diaz MD     Subjective:   Breathing a little Exam:  General:  Alert, in no apparent distress, chronically ill-looking  Neck:  mild JVP  Cardiac:  Regular, 3/6 BIBI  Lungs: Diminished BS in the bases  Abdomen:  Non-distended, soft, non-tender, BS+.   Extremities:   2+ LE edema bilaterally, 1+ edema to U

## 2017-01-25 NOTE — PHYSICAL THERAPY NOTE
PHYSICAL THERAPY TREATMENT NOTE - INPATIENT    Room Number: 2626/3513-B     Session: 3  Number of Visits to Meet Established Goals: 5    Presenting Problem: acute on chronic CHF  History related to current admission: pt admitted from home due to sob, weak Static Sitting: Good  Dynamic Sitting: Not tested           Static Standing: Fair -  Dynamic Standing: Poor +    ACTIVITY TOLERANCE  O2 Saturation: 94%  O2 Saturation with acti Sets   1     Patient End of Session: Up in chair;Needs met;RN aware of session/findings;Call light within reach; All patient questions and concerns addressed; Family present    ASSESSMENT   Pt cont to present with dec strength, balance, activity tolerance,

## 2017-01-25 NOTE — PROGRESS NOTES
[facility]  Progress Note    Mojgan Thayer Patient Status:  Inpatient    3/22/1920 MRN [id number]   [facility] 2NE-A Attending Adriano Rosales MD   [facility] Day # 7 PCP Xi Arnold MD     Subjective:  [facility] Otherwise, no significant change since 1/21/17. Persistent consolidation within the mid and lower lungs including air bronchograms within the right lung base.      1/19 ECHO:  Conclusions:    1.  Left ventricle:  There was sinus bradycardia with rates varyi  regurgitation. 10. Pulmonary arteries: Systolic pressure was markedly increased, estimated      to be 65mm Hg. 11. Pericardium, extracardiac: There was a left pleural effusion.   Impressions:  This study is compared with previous dated 06/10/2013:  Estee Haywood for falling     Unspecified essential hypertension     Hypothyroid     Essential hypertension     NEIDA (acute kidney injury) (HCC)     Bradycardia     Macrocytosis     Hypercapnia     CKD stage 4 due to type 2 diabetes mellitus ([facility] [address]     Acute pulmonary zabrina

## 2017-01-26 LAB
BUN BLD-MCNC: 80 MG/DL (ref 8–20)
CALCIUM BLD-MCNC: 10 MG/DL (ref 8.3–10.3)
CHLORIDE: 100 MMOL/L (ref 101–111)
CO2: 30 MMOL/L (ref 22–32)
CREAT BLD-MCNC: 3.23 MG/DL (ref 0.55–1.02)
GLUCOSE BLD-MCNC: 96 MG/DL (ref 70–99)
POTASSIUM SERPL-SCNC: 3.7 MMOL/L (ref 3.6–5.1)
SODIUM SERPL-SCNC: 139 MMOL/L (ref 136–144)

## 2017-01-26 PROCEDURE — 99232 SBSQ HOSP IP/OBS MODERATE 35: CPT | Performed by: HOSPITALIST

## 2017-01-26 PROCEDURE — 99232 SBSQ HOSP IP/OBS MODERATE 35: CPT | Performed by: INTERNAL MEDICINE

## 2017-01-26 RX ORDER — ACETAMINOPHEN 325 MG/1
650 TABLET ORAL EVERY 6 HOURS PRN
Status: DISCONTINUED | OUTPATIENT
Start: 2017-01-26 | End: 2017-02-07

## 2017-01-26 RX ORDER — POTASSIUM CHLORIDE 20 MEQ/1
40 TABLET, EXTENDED RELEASE ORAL ONCE
Status: COMPLETED | OUTPATIENT
Start: 2017-01-26 | End: 2017-01-26

## 2017-01-26 NOTE — PROGRESS NOTES
[facility]  Progress Note     Geovany Du Patient Status:  Inpatient    3/22/1920 MRN [id number]   [facility] 2NE-A Attending Harpreet Domingo, [address] [facility] Day # 8 PCP Francine Harrison MD     Chief Complaint: SOB/cough    S: Patient Eye Daily   • hydrALAzine HCl  25 mg Oral Q8H [address]       ASSESSMENT / PLAN:     1. Acute on chronic diastolic heart failure  1. Continue diuresis per renal  2. Follow I/O's  3. Daily weights  4. Monitor renal function   5. Echo noted  2.  Hypoxic/hypercapnic

## 2017-01-26 NOTE — PROGRESS NOTES
[facility]  Nephrology Progress Note    Juan Mario Attending:  Una Balbuena,        Assessment and Plan:  1) CKD 4- longstanding, presumably due to hypertensive and age-related nephrosclerosis with baseline Cr approx 2.5 mg/dl x yrs- relative nerves grossly intact, moving all extremities  Skin: Warm and dry, no rashes       Labs:     Lab Results  Component Value Date   CREATSERUM 3.23 01/26/2017   BUN 80 01/26/2017    01/26/2017   K 3.7 01/26/2017    01/26/2017   CO2 30.0 01/26/2017

## 2017-01-26 NOTE — DIETARY NOTE
Nutrition Short Note    Consult received per family request.  Diet reviewed- good understanding of sodium/fluid restrictions. Specific food preference reported by dtr and RD will honor.      Wing Sears RD, LDN  Pager [phone]

## 2017-01-26 NOTE — PROGRESS NOTES
[facility]  Progress Note    Luke Girard Patient Status:  Inpatient    3/22/1920 MRN [id number]   [facility] 2NE-A Attending Tico Vital MD   [facility] Day # 8 PCP Fouzia Rogers MD     Subjective:  [facility] change since 1/21/17. Persistent consolidation within the mid and lower lungs including air bronchograms within the right lung base.      1/19 ECHO:  Conclusions:    1.  Left ventricle:  There was sinus bradycardia with rates varying from 33 -      60 bpm. arteries: Systolic pressure was markedly increased, estimated      to be 65mm Hg. 11. Pericardium, extracardiac: There was a left pleural effusion.   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

## 2017-01-26 NOTE — PROGRESS NOTES
[facility]  Cardiology Progress Note    Femi Rizzo Patient Status:  Inpatient    3/22/1920 MRN [id number]   [facility] 2NE-A Attending Lani Vasquez, [address] [facility] Day # 8 PCP Trevor Davila MD     Subjective:   Feeling better, no distress, chronically ill-looking  Neck:  mild JVP  Cardiac:  Regular, 3/6 BIBI  Lungs: Diminished BS in the bases  Abdomen:  Non-distended, soft, non-tender, BS+.   Extremities:   1-2+ LE edema bilaterally, edema to UE   Neuro:   Alert and oriented x 3; mov

## 2017-01-27 LAB
BUN BLD-MCNC: 83 MG/DL (ref 8–20)
CALCIUM BLD-MCNC: 10 MG/DL (ref 8.3–10.3)
CHLORIDE: 99 MMOL/L (ref 101–111)
CO2: 30 MMOL/L (ref 22–32)
CREAT BLD-MCNC: 3.26 MG/DL (ref 0.55–1.02)
ERYTHROCYTE [DISTWIDTH] IN BLOOD BY AUTOMATED COUNT: 13.3 % (ref 11.5–16)
GLUCOSE BLD-MCNC: 91 MG/DL (ref 70–99)
HCT VFR BLD AUTO: 35.2 % (ref 34–50)
HGB BLD-MCNC: 11.1 G/DL (ref 12–16)
MCH RBC QN AUTO: 35.8 PG (ref 27–33.2)
MCHC RBC AUTO-ENTMCNC: 31.5 G/DL (ref 31–37)
MCV RBC AUTO: 113.5 FL (ref 81–100)
PLATELET # BLD AUTO: 127 10(3)UL (ref 150–450)
POTASSIUM SERPL-SCNC: 4 MMOL/L (ref 3.6–5.1)
RBC # BLD AUTO: 3.1 X10(6)UL (ref 3.8–5.1)
RED CELL DISTRIBUTION WIDTH-SD: 55.8 FL (ref 35.1–46.3)
SODIUM SERPL-SCNC: 139 MMOL/L (ref 136–144)
WBC # BLD AUTO: 5.4 X10(3) UL (ref 4–13)

## 2017-01-27 PROCEDURE — 99232 SBSQ HOSP IP/OBS MODERATE 35: CPT | Performed by: INTERNAL MEDICINE

## 2017-01-27 PROCEDURE — 99232 SBSQ HOSP IP/OBS MODERATE 35: CPT | Performed by: HOSPITALIST

## 2017-01-27 NOTE — PROGRESS NOTES
[facility]  Nephrology Progress Note    Luke Girard Attending:  Lopez Hull,        Assessment and Plan:  1) CKD 4- longstanding, presumably due to hypertensive and age-related nephrosclerosis with baseline Cr approx 2.5 mg/dl x yrs- relative HCT 35.2 01/27/2017   .0 01/27/2017   CREATSERUM 3.26 01/27/2017   BUN 83 01/27/2017    01/27/2017   K 4.0 01/27/2017   CL 99 01/27/2017   CO2 30.0 01/27/2017   GLU 91 01/27/2017   CA 10.0 01/27/2017       Imaging:   All imaging studies revie

## 2017-01-27 NOTE — PROGRESS NOTES
[facility]  Progress Note     Marcelo Natarajan Patient Status:  Inpatient    3/22/1920 MRN [id number]   [facility] 2NE-A Attending David Ware, [address] [facility] Day # 9 PCP Wilman Rehman MD     Chief Complaint: SOB/cough    S: Patient 1. Acute on chronic diastolic heart failure  1. Continue diuresis per renal, on bumex drip and diuril  2. Follow I/O's  3. Daily weights  4. Monitor renal function   5. Echo noted  2. Hypoxic/hypercapnic respiratory failure   1.  Likely multifactorial s

## 2017-01-27 NOTE — PROGRESS NOTES
[facility]  Cardiology Progress Note    Oksana Frida Patient Status:  Inpatient    3/22/1920 MRN [id number]   [facility] 2NE-A Attending Kyra Samuel, [address] [facility] [address] Day # 9 PCP Bety Gonzales MD     Subjective:   Feeling better, no Physical Exam:  General:  Alert, in no apparent distress, chronically ill-looking  Neck:  mild JVP  Cardiac:  Regular, 3/6 BIBI  Lungs: Diminished BS in the bases  Abdomen:  Non-distended, soft, non-tender, BS+.   Extremities:   1+ LE edema bilaterally

## 2017-01-27 NOTE — CM/SW NOTE
Patient discussed with RN. Patient is on a Bumex Drip at this time. Patient currently is needing 3L O2, does not have home O2, will need to monitor. Plan is for patient to return home with daughter and will be followed by [facility]. CM/SW to continue to follow.

## 2017-01-27 NOTE — PROGRESS NOTES
[facility]  Progress Note    Zeeshan Moreno Patient Status:  Inpatient    3/22/1920 MRN [id number]   [facility] 2NE-A Attending Roswell Sicard, [address] [facility] [address] Day # 9 PCP Nicolasa Muñoz MD     Subjective:  [facility] pneumothorax. Mild interval improvement in aeration the right upper lung.      =====  CONCLUSION:  Mild improvement in aeration of the right upper lung. Otherwise, no significant change since 1/21/17.  Persistent consolidation within the mid and lower lungs markedly dilated. 8.  Atrial septum: There was a patent foramen ovale. 9.  Tricuspid valve: Structurally normal valve. There was mild-moderate      regurgitation.   10. Pulmonary arteries: Systolic pressure was markedly increased, estimated      to be 65m foot     Acute on chronic congestive heart failure (HCC)     CKD (chronic kidney disease)     Acute on chronic congestive heart failure, unspecified congestive heart failure type ([facility] [address]     Hypoxia     At risk for falling     Unspecified essential hypertens

## 2017-01-28 LAB
BUN BLD-MCNC: 79 MG/DL (ref 8–20)
CALCIUM BLD-MCNC: 9.8 MG/DL (ref 8.3–10.3)
CHLORIDE: 96 MMOL/L (ref 101–111)
CO2: 34 MMOL/L (ref 22–32)
CREAT BLD-MCNC: 3.26 MG/DL (ref 0.55–1.02)
GLUCOSE BLD-MCNC: 85 MG/DL (ref 70–99)
HAV IGM SER QL: 2.4 MG/DL (ref 1.7–3)
POTASSIUM SERPL-SCNC: 3.5 MMOL/L (ref 3.6–5.1)
SODIUM SERPL-SCNC: 138 MMOL/L (ref 136–144)

## 2017-01-28 PROCEDURE — 99233 SBSQ HOSP IP/OBS HIGH 50: CPT | Performed by: INTERNAL MEDICINE

## 2017-01-28 PROCEDURE — 99232 SBSQ HOSP IP/OBS MODERATE 35: CPT | Performed by: HOSPITALIST

## 2017-01-28 RX ORDER — ARIPIPRAZOLE 15 MG/1
40 TABLET ORAL ONCE
Status: COMPLETED | OUTPATIENT
Start: 2017-01-28 | End: 2017-01-28

## 2017-01-28 RX ORDER — POLYETHYLENE GLYCOL 3350 17 G/17G
17 POWDER, FOR SOLUTION ORAL DAILY PRN
Status: DISCONTINUED | OUTPATIENT
Start: 2017-01-28 | End: 2017-02-07

## 2017-01-28 NOTE — PLAN OF CARE
CARDIOVASCULAR - ADULT    • Maintains optimal cardiac output and hemodynamic stability Progressing        RESPIRATORY - ADULT    • Achieves optimal ventilation and oxygenation Progressing        SAFETY ADULT - FALL    • Free from fall injury Progressing

## 2017-01-28 NOTE — PROGRESS NOTES
[facility]  Nephrology Progress Note    Hannah Mcak Attending:  Jaida Ocampo DO       Subjective:  Awake up in chair still on NC O2 with some dyspnea      Current Facility-Administered Medications:  acetaminophen (TYLENOL) tab 650 mg 650 mg Ora thyromegaly  Cardiac: Regular rate and rhythm, S1, S2 normal, no murmur or tub  Lungs: Decreased BS at bases bilaterally   Abdomen: Soft, non-tender. + bowel sounds, no palpable organomegaly  Extremities: Without clubbing, cyanosis; 2+ LE edema  Neurologic

## 2017-01-28 NOTE — PROGRESS NOTES
[facility] Pulmonary/Critical Care Progress Note     SUBJECTIVE/24H Events: All events, procedures, notes reviewed.  No respiratory complaints      Review of Systems:   A comprehensive 14 point review of systems was comp hours. No results for input(s): ABGPHT, IGWLML4K, ECOAG6B, ABGHCO3, ABGBE, TEMP, MILLIE, SITE, DEV, THGB in the last 72 hours. Invalid input(s): TWM84KWP, CHOB    Other Labs: Interval Culture Data:     Hospital Encounter on 01/18/17  1.  URINE CULTURE respiratory status is improving slowly with diuresis.       Unruly Judge MD MPH  [facility]

## 2017-01-28 NOTE — PROGRESS NOTES
[facility]  Progress Note     Augustinenishi Pugh Patient Status:  Inpatient    3/22/1920 MRN [id number]   [facility] 2NE-A Attending Vasu Lopes, [address] [facility] Day # 10 PCP Concha June MD     Chief Complaint: Evelyn MIRAMONTES: LUIS ENRIQUE ASSESSMENT / PLAN:     1. Acute on chronic diastolic heart failure  1. Continue diuresis per renal, on bumex drip and diuril  2. Follow I/O's  3. Daily weights  4. Monitor renal function   5. Echo noted  2.  Hypoxic/hypercapnic respiratory failure   1

## 2017-01-29 LAB
BUN BLD-MCNC: 86 MG/DL (ref 8–20)
CALCIUM BLD-MCNC: 10.2 MG/DL (ref 8.3–10.3)
CHLORIDE: 95 MMOL/L (ref 101–111)
CO2: 35 MMOL/L (ref 22–32)
CREAT BLD-MCNC: 3.39 MG/DL (ref 0.55–1.02)
GLUCOSE BLD-MCNC: 98 MG/DL (ref 70–99)
HAV IGM SER QL: 2.6 MG/DL (ref 1.7–3)
POTASSIUM SERPL-SCNC: 3.8 MMOL/L (ref 3.6–5.1)
SODIUM SERPL-SCNC: 137 MMOL/L (ref 136–144)

## 2017-01-29 PROCEDURE — 99233 SBSQ HOSP IP/OBS HIGH 50: CPT | Performed by: INTERNAL MEDICINE

## 2017-01-29 PROCEDURE — 99233 SBSQ HOSP IP/OBS HIGH 50: CPT | Performed by: HOSPITALIST

## 2017-01-29 RX ORDER — ARIPIPRAZOLE 15 MG/1
40 TABLET ORAL ONCE
Status: COMPLETED | OUTPATIENT
Start: 2017-01-29 | End: 2017-01-29

## 2017-01-29 RX ORDER — HYDRALAZINE HYDROCHLORIDE 50 MG/1
50 TABLET, FILM COATED ORAL EVERY 8 HOURS SCHEDULED
Status: DISCONTINUED | OUTPATIENT
Start: 2017-01-29 | End: 2017-02-01

## 2017-01-29 RX ORDER — HYDRALAZINE HYDROCHLORIDE 50 MG/1
50 TABLET, FILM COATED ORAL EVERY 8 HOURS SCHEDULED
Status: DISCONTINUED | OUTPATIENT
Start: 2017-01-29 | End: 2017-01-29

## 2017-01-29 RX ORDER — SPIRONOLACTONE 25 MG/1
25 TABLET ORAL DAILY
Status: DISCONTINUED | OUTPATIENT
Start: 2017-01-29 | End: 2017-01-31

## 2017-01-29 RX ORDER — HYDRALAZINE HYDROCHLORIDE 50 MG/1
100 TABLET, FILM COATED ORAL EVERY 8 HOURS SCHEDULED
Status: DISCONTINUED | OUTPATIENT
Start: 2017-01-29 | End: 2017-01-29

## 2017-01-29 NOTE — PLAN OF CARE
CARDIOVASCULAR - ADULT    • Maintains optimal cardiac output and hemodynamic stability Progressing        Impaired Activities of Daily Living    • Achieve highest/safest level of independence in self care Progressing        Patient/Family Goals    • Patien

## 2017-01-29 NOTE — PROGRESS NOTES
[facility]  Nephrology Progress Note    Gera Foster Attending:  Laurita Kumar,        Subjective:  Feels better  Denies cp      Current Facility-Administered Medications:  hydrALAzine HCl (APRESOLINE) tab 50 mg 50 mg Oral Q8H [facility]   PEG 3350 (MI No scleral icterus, MMM  Neck: Supple, no ROSHNI or thyromegaly  Cardiac: Regular rate and rhythm, S1, S2 normal, no murmur or tub  Lungs: Decreased BS at bases bilaterally   Abdomen: Soft, non-tender. + bowel sounds, no palpable organomegaly  Extremities:  Wi

## 2017-01-29 NOTE — CM/SW NOTE
carole notified by Dr Leana Marquez that pt will discharge in the next day or two and will need home oxygen. carole spoke to RN and requested that she have pulmonary MD write order for oxygen. [facility] liaison notified.  carole to young

## 2017-01-29 NOTE — PLAN OF CARE
METABOLIC/FLUID AND ELECTROLYTES - ADULT    • Electrolytes maintained within normal limits Progressing        PAIN - ADULT    • Verbalizes/displays adequate comfort level or patient's stated pain goal Progressing        RESPIRATORY - ADULT    • Achieves op

## 2017-01-29 NOTE — PROGRESS NOTES
[facility]  Progress Note     Pierre Olga Lidias Patient Status:  Inpatient    3/22/1920 MRN [id number]   [facility] 2NE-A Attending Carol Corey, [address] [facility] Day # 6 PCP Kumar Shah MD     Chief Complaint: Leland Garcia    S: IDCMHG / PLAN:     1. Acute on chronic diastolic heart failure  1. Continue diuresis per renal, on bumex drip and diuril  2. Follow I/O's  3. Daily weights  4. Monitor renal function   5. Echo EF: 75%, Grade II DD  2. Hypoxic/hypercapnic respiratory failure   1.

## 2017-01-29 NOTE — PROGRESS NOTES
[facility] Pulmonary/Critical Care Progress Note     SUBJECTIVE/24H Events: All events, procedures, notes reviewed. Better      Review of Systems:   A comprehensive 14 point review of systems was completed.    Pertinent CULTURE, ROUTINE     Status: Abnormal   Collection Time: 01/21/17  4:22 AM   Result Value Ref Range   Urine Culture 50,000 CFU/ML Escherichia coli (A) N/A   Urine Culture 80,000 CFU/ML Mixed gram positive manjit N/A   *Culture results found, see result in C

## 2017-01-30 LAB
BUN BLD-MCNC: 84 MG/DL (ref 8–20)
CALCIUM BLD-MCNC: 10.3 MG/DL (ref 8.3–10.3)
CHLORIDE: 94 MMOL/L (ref 101–111)
CO2: 37 MMOL/L (ref 22–32)
CREAT BLD-MCNC: 3.45 MG/DL (ref 0.55–1.02)
ERYTHROCYTE [DISTWIDTH] IN BLOOD BY AUTOMATED COUNT: 13.2 % (ref 11.5–16)
GLUCOSE BLD-MCNC: 87 MG/DL (ref 70–99)
HAV IGM SER QL: 2.7 MG/DL (ref 1.7–3)
HCT VFR BLD AUTO: 32.5 % (ref 34–50)
HGB BLD-MCNC: 10.7 G/DL (ref 12–16)
MCH RBC QN AUTO: 36.4 PG (ref 27–33.2)
MCHC RBC AUTO-ENTMCNC: 32.9 G/DL (ref 31–37)
MCV RBC AUTO: 110.5 FL (ref 81–100)
PLATELET # BLD AUTO: 137 10(3)UL (ref 150–450)
POTASSIUM SERPL-SCNC: 4 MMOL/L (ref 3.6–5.1)
RBC # BLD AUTO: 2.94 X10(6)UL (ref 3.8–5.1)
RED CELL DISTRIBUTION WIDTH-SD: 53.7 FL (ref 35.1–46.3)
SODIUM SERPL-SCNC: 137 MMOL/L (ref 136–144)
WBC # BLD AUTO: 5.2 X10(3) UL (ref 4–13)

## 2017-01-30 PROCEDURE — 99233 SBSQ HOSP IP/OBS HIGH 50: CPT | Performed by: INTERNAL MEDICINE

## 2017-01-30 PROCEDURE — 99233 SBSQ HOSP IP/OBS HIGH 50: CPT | Performed by: HOSPITALIST

## 2017-01-30 NOTE — PROGRESS NOTES
[facility]  Progress Note      Patient Status:  Inpatient    3/22/1920 MRN [id number]   [facility] 2NE-A Attending Raul Vo, [address] [facility] Day # 15 PCP Naveen Walsh MD     Chief Complaint: Slade Roach    S: RIEBWV drop Right Eye Daily       ASSESSMENT / PLAN:     1. Acute on chronic diastolic heart failure/fulid OL  1. Continue diuresis per renal, on bumex drip and diuril  2. On aldactone 25 daily  3. Follow I/O's  4. Daily weights  5. Echo EF: 75%, Grade II DD  2.

## 2017-01-30 NOTE — CM/SW NOTE
Patient discussed in rounds with RN and Nephrologist. Nephrology has discussed with patient and patient's daughter regarding anticipated need for HD to assist with patient's fluid retention.  Patient and patient's daughter to discuss this further with other

## 2017-01-30 NOTE — PHYSICAL THERAPY NOTE
Attempted to see pt for PT session, and pt was currently using the bathroom. Will attempt to see pt later as time allows.  MONO

## 2017-01-30 NOTE — PROGRESS NOTES
[facility]  Progress Note    Joslyn Luna Patient Status:  Inpatient    3/22/1920 MRN [id number]   [facility] 2NE-A Attending Pernell Castleman, MD   [facility] Day # 12 PCP Krista Rhodes MD     Assessment:      · Hypoxic/ Hypercarbic Re L/min  Pulse Oximetry Type: Continuous  Pulse Ox Probe Location: Right hand  Blood pressure 178/48, pulse 65, temperature 97.8 °F (36.6 °C), temperature source Oral, resp. rate 22, height 4' 11\" (1.499 m), weight 114 lb 10.2 oz (52 kg), SpO2 93 %.     Luna Wall MG/5ML suspension 30 mL 30 mL Oral Daily PRN   acetaminophen (TYLENOL) tab 650 mg 650 mg Oral Q6H PRN   chlorothiazide (DIURIL) 500 mg in Sterile Water for Injection IV push 500 mg Intravenous Q12H   bumetanide (BUMEX) 12.5 mg in sodium chloride 0.9 % 100

## 2017-01-30 NOTE — PHYSICAL THERAPY NOTE
Attempted to see pt for PT session this am, however per pt's daughter, pt is not feeling well this morning and is refusing PT. Will attempt to see pt on 1/31/17.  CM

## 2017-01-30 NOTE — PROGRESS NOTES
[facility]  Nephrology Progress Note    Bo Proctor Attending:  Loni Schuler,        Assessment and Plan:  1) CKD 4- longstanding, presumably due to hypertensive and age-related nephrosclerosis with baseline Cr approx 2.5 mg/dl x yrs- relative Labs:     Lab Results  Component Value Date   WBC 5.2 01/30/2017   HGB 10.7 01/30/2017   HCT 32.5 01/30/2017   .0 01/30/2017   CREATSERUM 3.45 01/30/2017   BUN 84 01/30/2017    01/30/2017   K 4.0 01/30/2017   CL 94 01/30/2017   CO2 37.0 01

## 2017-01-30 NOTE — PROGRESS NOTES
[facility]  Cardiology Progress Note    Subjective:  Apparently tolerated BiPap over night. Feels better overall.     Objective:  /43 mmHg  Pulse 70  Temp(Src) 98.4 °F (36.9 °C) (Oral)  Resp 26  Ht 4' 11\" (1.499 m)  Wt 114 lb 10.2 oz (52 kg)  B hospitalization had 2nd Degree AVHB Type 1, which has since resolved off beta blocker. Episodes of PAT are short and asymptomatic.   Will continue to hold any further AVN blocking agents for now due to hx bradycardia, but if increase in severity or frequen

## 2017-01-30 NOTE — PLAN OF CARE
Problem: CARDIOVASCULAR - ADULT  Goal: Absence of cardiac arrhythmias or at baseline  INTERVENTIONS:  - Continuous cardiac monitoring, monitor vital signs, obtain 12 lead EKG if indicated  - Evaluate effectiveness of antiarrhythmic and heart rate control m

## 2017-01-31 ENCOUNTER — APPOINTMENT (OUTPATIENT)
Dept: INTERVENTIONAL RADIOLOGY/VASCULAR | Facility: HOSPITAL | Age: 82
DRG: 291 | End: 2017-01-31
Attending: INTERNAL MEDICINE
Payer: MEDICARE

## 2017-01-31 LAB
BASOPHILS # BLD AUTO: 0.02 X10(3) UL (ref 0–0.1)
BASOPHILS NFR BLD AUTO: 0.4 %
BUN BLD-MCNC: 84 MG/DL (ref 8–20)
CALCIUM BLD-MCNC: 10.2 MG/DL (ref 8.3–10.3)
CHLORIDE: 91 MMOL/L (ref 101–111)
CO2: 40 MMOL/L (ref 22–32)
CREAT BLD-MCNC: 3.44 MG/DL (ref 0.55–1.02)
EOSINOPHIL # BLD AUTO: 0.04 X10(3) UL (ref 0–0.3)
EOSINOPHIL NFR BLD AUTO: 0.8 %
ERYTHROCYTE [DISTWIDTH] IN BLOOD BY AUTOMATED COUNT: 12.8 % (ref 11.5–16)
GLUCOSE BLD-MCNC: 97 MG/DL (ref 70–99)
HCT VFR BLD AUTO: 32.6 % (ref 34–50)
HGB BLD-MCNC: 10.7 G/DL (ref 12–16)
IMMATURE GRANULOCYTE COUNT: 0.02 X10(3) UL (ref 0–1)
IMMATURE GRANULOCYTE RATIO %: 0.4 %
LYMPHOCYTES # BLD AUTO: 0.28 X10(3) UL (ref 0.9–4)
LYMPHOCYTES NFR BLD AUTO: 5.6 %
MCH RBC QN AUTO: 35.5 PG (ref 27–33.2)
MCHC RBC AUTO-ENTMCNC: 32.8 G/DL (ref 31–37)
MCV RBC AUTO: 108.3 FL (ref 81–100)
MONOCYTES # BLD AUTO: 0.68 X10(3) UL (ref 0.1–0.6)
MONOCYTES NFR BLD AUTO: 13.7 %
NEUTROPHIL ABS PRELIM: 3.93 X10 (3) UL (ref 1.3–6.7)
NEUTROPHILS # BLD AUTO: 3.93 X10(3) UL (ref 1.3–6.7)
NEUTROPHILS NFR BLD AUTO: 79.1 %
PLATELET # BLD AUTO: 149 10(3)UL (ref 150–450)
POTASSIUM SERPL-SCNC: 3.6 MMOL/L (ref 3.6–5.1)
RBC # BLD AUTO: 3.01 X10(6)UL (ref 3.8–5.1)
RED CELL DISTRIBUTION WIDTH-SD: 51.1 FL (ref 35.1–46.3)
SODIUM SERPL-SCNC: 136 MMOL/L (ref 136–144)
WBC # BLD AUTO: 5 X10(3) UL (ref 4–13)

## 2017-01-31 PROCEDURE — B518ZZA FLUOROSCOPY OF SUPERIOR VENA CAVA, GUIDANCE: ICD-10-PCS | Performed by: RADIOLOGY

## 2017-01-31 PROCEDURE — 99232 SBSQ HOSP IP/OBS MODERATE 35: CPT | Performed by: HOSPITALIST

## 2017-01-31 PROCEDURE — 02HV33Z INSERTION OF INFUSION DEVICE INTO SUPERIOR VENA CAVA, PERCUTANEOUS APPROACH: ICD-10-PCS | Performed by: RADIOLOGY

## 2017-01-31 PROCEDURE — 5A1D60Z PERFORMANCE OF URINARY FILTRATION, MULTIPLE: ICD-10-PCS | Performed by: INTERNAL MEDICINE

## 2017-01-31 PROCEDURE — 99233 SBSQ HOSP IP/OBS HIGH 50: CPT | Performed by: INTERNAL MEDICINE

## 2017-01-31 RX ORDER — LIDOCAINE HYDROCHLORIDE 10 MG/ML
INJECTION, SOLUTION INFILTRATION; PERINEURAL
Status: COMPLETED
Start: 2017-01-31 | End: 2017-01-31

## 2017-01-31 RX ORDER — MIDAZOLAM HYDROCHLORIDE 1 MG/ML
INJECTION INTRAMUSCULAR; INTRAVENOUS
Status: COMPLETED
Start: 2017-01-31 | End: 2017-01-31

## 2017-01-31 RX ORDER — HEPARIN SODIUM 1000 [USP'U]/ML
1.7 INJECTION, SOLUTION INTRAVENOUS; SUBCUTANEOUS ONCE
Status: COMPLETED | OUTPATIENT
Start: 2017-01-31 | End: 2017-01-31

## 2017-01-31 RX ORDER — HEPARIN SODIUM 1000 [USP'U]/ML
1.6 INJECTION, SOLUTION INTRAVENOUS; SUBCUTANEOUS ONCE
Status: COMPLETED | OUTPATIENT
Start: 2017-01-31 | End: 2017-01-31

## 2017-01-31 RX ORDER — LIDOCAINE HYDROCHLORIDE AND EPINEPHRINE 10; 10 MG/ML; UG/ML
INJECTION, SOLUTION INFILTRATION; PERINEURAL
Status: COMPLETED
Start: 2017-01-31 | End: 2017-01-31

## 2017-01-31 RX ORDER — HEPARIN SODIUM 5000 [USP'U]/ML
INJECTION, SOLUTION INTRAVENOUS; SUBCUTANEOUS
Status: COMPLETED
Start: 2017-01-31 | End: 2017-01-31

## 2017-01-31 RX ORDER — TORSEMIDE 20 MG/1
40 TABLET ORAL
Status: DISCONTINUED | OUTPATIENT
Start: 2017-01-31 | End: 2017-02-02

## 2017-01-31 NOTE — PROGRESS NOTES
[facility] Cardiology Progress Note    Geovany Du Patient Status:  Inpatient    3/22/1920 MRN [id number]   [facility] 2NE-A Attending Massiel Obregon MD   [facility] Day # 15 PCP Francine Harrison MD     Subjective:  She i from 33 -      60 bpm. The cavity size was normal. Wall thickness was normal. Systolic      function was hyperdynamic with mid-cavity obliteration. The estimated      ejection fraction was 75%.  Diastolic features are consistent with a      pseudonormal lef previous study, resting heart rate has changed from normal  sinus at ~ 72 bpm to sinus bradycardia with rates from 33 - 60 bpm. The  above noted left pleural effusion was not previously present.     Physical Exam:  General:  Alert, in no apparent distress

## 2017-01-31 NOTE — PROGRESS NOTES
[facility]  Progress Note     Luke Girard Patient Status:  Inpatient    3/22/1920 MRN [id number]   [facility] 2NE-A Attending Lopez Hull, [address] [facility] Day # 15 PCP oFuzia Rogers MD     Chief Complaint: Markie Castle    S: CXZFJP mg Oral QPM   • prednisoLONE acetate  1 drop Right Eye Daily       ASSESSMENT / PLAN:     1. Acute on chronic diastolic heart failure/fluid overload  1. Continue diuresis per renal, on bumex drip and diuril  2. On aldactone 25 daily  3. Follow I/O's  4.  Ewa Regan

## 2017-01-31 NOTE — CM/SW NOTE
Complex Care Assessment    Dunnearuna Wilkerson Patient Status:  Inpatient   Age/Gender 80year old female MRN [id number]   [facility] 2NE-A Attending Maude Zamora, [facility] Day # 15 PCP Peggy Rinaldi MD     Assessment Type: Initial    C

## 2017-01-31 NOTE — PLAN OF CARE
Patient continues on bumex gtt and diuril. Patient's daughter stated that patient needs to have a BM prior to permacath placement tomorrow afternoon. Milk of magnesia given to patient per request.    Patient requiring 5L O2 via nasal cannula.  Patient as

## 2017-01-31 NOTE — PLAN OF CARE
Patient's daughter wanted to hold off on the diuril this morning until she spoke with Dr Freddie Whitman or a nephrologist regarding patient's weight loss this morning.

## 2017-01-31 NOTE — CM/SW NOTE
Patient discussed in rounds with RN. Patient to have a permacath for HD placed this afternoon with anticipation that patient will also have her first HD treatment after cath placement. Patient is on 5L O2, patient does not have home O2.  At this time it is

## 2017-01-31 NOTE — PHYSICAL THERAPY NOTE
PHYSICAL THERAPY TREATMENT NOTE - INPATIENT    Room Number: 7222/4193-F     Session: 4  Number of Visits to Meet Established Goals: 5    Presenting Problem: acute on chronic CHF     History related to current admission: pt admitted from home due to sob, w Static Sitting: Good  Dynamic Sitting: Not tested           Static Standing: Fair -  Dynamic Standing: Poor +    ACTIVITY TOLERANCE  3 L o2,  spo2 30%    AM-PAC '6-Clicks' INPATIENT SHORT FORM - BASIC MOBILITY  How Repetitions   10   Sets   1     Patient End of Session: Up in chair; With [facility] staff;Needs met    ASSESSMENT   Pt NPO for permcatheter today.  Pt presents with dec balance, dec strength, dec endurance and CGA to sup during functional transfers/gait and bed m

## 2017-01-31 NOTE — PROCEDURES
[facility]  Procedure Note    Yoletteroque Hickey Patient Status:  Inpatient    3/22/1920 MRN [id number]   [facility] 2NE-A Attending Ngoc Matthews MD   [facility] Day # 15 PCP Omer Mendoza MD     Procedure: tunneled right IJ hemodialysis

## 2017-01-31 NOTE — PROGRESS NOTES
[facility]  Nephrology Progress Note    Marsha  Attending:  Noe Hui DO       Assessment and Plan:  1) CKD 4- longstanding, presumably due to hypertensive and age-related nephrosclerosis with baseline Cr approx 2.5 mg/dl x yrs- relative Lab Results  Component Value Date   WBC 5.0 01/31/2017   HGB 10.7 01/31/2017   HCT 32.6 01/31/2017   .0 01/31/2017   CREATSERUM 3.44 01/31/2017   BUN 84 01/31/2017    01/31/2017   K 3.6 01/31/2017   CL 91 01/31/2017   CO2 40.0 01/31/2017

## 2017-02-01 LAB
BASOPHILS # BLD AUTO: 0.02 X10(3) UL (ref 0–0.1)
BASOPHILS NFR BLD AUTO: 0.3 %
BUN BLD-MCNC: 32 MG/DL (ref 8–20)
CALCIUM BLD-MCNC: 9.3 MG/DL (ref 8.3–10.3)
CHLORIDE: 96 MMOL/L (ref 101–111)
CO2: 34 MMOL/L (ref 22–32)
CREAT BLD-MCNC: 1.86 MG/DL (ref 0.55–1.02)
EOSINOPHIL # BLD AUTO: 0.05 X10(3) UL (ref 0–0.3)
EOSINOPHIL NFR BLD AUTO: 0.8 %
ERYTHROCYTE [DISTWIDTH] IN BLOOD BY AUTOMATED COUNT: 12.7 % (ref 11.5–16)
GLUCOSE BLD-MCNC: 79 MG/DL (ref 70–99)
HBV SURFACE AG SERPL QL IA: NONREACTIVE
HCT VFR BLD AUTO: 32.2 % (ref 34–50)
HGB BLD-MCNC: 10.5 G/DL (ref 12–16)
IMMATURE GRANULOCYTE COUNT: 0.01 X10(3) UL (ref 0–1)
IMMATURE GRANULOCYTE RATIO %: 0.2 %
LYMPHOCYTES # BLD AUTO: 0.18 X10(3) UL (ref 0.9–4)
LYMPHOCYTES NFR BLD AUTO: 3 %
MCH RBC QN AUTO: 35.5 PG (ref 27–33.2)
MCHC RBC AUTO-ENTMCNC: 32.6 G/DL (ref 31–37)
MCV RBC AUTO: 108.8 FL (ref 81–100)
MONOCYTES # BLD AUTO: 0.73 X10(3) UL (ref 0.1–0.6)
MONOCYTES NFR BLD AUTO: 12 %
NEUTROPHIL ABS PRELIM: 5.09 X10 (3) UL (ref 1.3–6.7)
NEUTROPHILS # BLD AUTO: 5.09 X10(3) UL (ref 1.3–6.7)
NEUTROPHILS NFR BLD AUTO: 83.7 %
PLATELET # BLD AUTO: 132 10(3)UL (ref 150–450)
POTASSIUM SERPL-SCNC: 3.7 MMOL/L (ref 3.6–5.1)
RBC # BLD AUTO: 2.96 X10(6)UL (ref 3.8–5.1)
RED CELL DISTRIBUTION WIDTH-SD: 50.7 FL (ref 35.1–46.3)
SODIUM SERPL-SCNC: 136 MMOL/L (ref 136–144)
WBC # BLD AUTO: 6.1 X10(3) UL (ref 4–13)

## 2017-02-01 PROCEDURE — 99232 SBSQ HOSP IP/OBS MODERATE 35: CPT | Performed by: HOSPITALIST

## 2017-02-01 PROCEDURE — 99233 SBSQ HOSP IP/OBS HIGH 50: CPT | Performed by: INTERNAL MEDICINE

## 2017-02-01 RX ORDER — POTASSIUM CHLORIDE 20 MEQ/1
20 TABLET, EXTENDED RELEASE ORAL ONCE
Status: COMPLETED | OUTPATIENT
Start: 2017-02-01 | End: 2017-02-01

## 2017-02-01 NOTE — PROGRESS NOTES
[facility]  Progress Note     Dallin Landis Patient Status:  Inpatient    3/22/1920 MRN [id number]   [facility] 2NE-A Attending Miriam Pereira, [address] [facility] Day # 15 PCP Jules Crowell MD     Chief Complaint: Merline Ana    S: VMEHGM • Pantoprazole Sodium  40 mg Oral QAM AC   • Atorvastatin Calcium  10 mg Oral QPM   • prednisoLONE acetate  1 drop Right Eye Daily       ASSESSMENT / PLAN:     1. Acute on chronic diastolic heart failure/fluid overload  1.  Now on PO diuretics and started

## 2017-02-01 NOTE — PROGRESS NOTES
[facility]  Nephrology Progress Note    Juan Mario Attending:  Una Balbuena DO       Assessment and Plan:  1) ESRD- longstanding CKD due to hypertensive and age-related nephrosclerosis with baseline Cr approx 2.5 mg/dl x yr now with refractory 02/01/2017   CREATSERUM 1.86 02/01/2017   BUN 32 02/01/2017    02/01/2017   K 3.7 02/01/2017   CL 96 02/01/2017   CO2 34.0 02/01/2017   GLU 79 02/01/2017   CA 9.3 02/01/2017       Imaging: All imaging studies reviewed.     Meds:     Current Facility-

## 2017-02-01 NOTE — PHYSICAL THERAPY NOTE
PHYSICAL THERAPY TREATMENT NOTE - INPATIENT    Room Number: 7070/9446-L     Session: 5, additional 2 sessions for goals   Number of Visits to Meet Established Goals: 5     History related to current admission: pt admitted from home due to elysia salinas Static Sitting: Good  Dynamic Sitting: Not tested           Static Standing: Fair -  Dynamic Standing: Poor +    ACTIVITY TOLERANCE  O2 Saturation at rest 98% and with activit pumps  Hip AB/AD  LAQ  standing with rw:  marching, partial squats and heel raises     Position Sitting & Standing     Repetitions   10   Sets   1     Patient End of Session: In bed;Needs met;Call light within reach;RN aware of session/findings; All patient

## 2017-02-01 NOTE — CM/SW NOTE
SW informed by nephrologist that recommendation would be for patient to go to [facility] with onsite HD prior to patient returning home at discharge.  SW met with patient and patient's daughter at bedside and provided list of SARs in the area and highlighted facil

## 2017-02-01 NOTE — PROGRESS NOTES
[facility] Cardiology Progress Note    Zafar Gregory Patient Status:  Inpatient    3/22/1920 MRN [id number]   [facility] 2NE-A Attending Mya Russell MD   [facility] Day # 15 PCP Mary Romero MD     Subjective:  She i prednisoLONE acetate  1 drop Right Eye Daily     Diagnostics:     Echo 1/19/17:  Conclusions:  1.  Left ventricle:  There was sinus bradycardia with rates varying from 33 -      60 bpm. The cavity size was normal. Wall thickness was normal. Systolic      fu 65mm Hg. 11. Pericardium, extracardiac: There was a left pleural effusion.   Impressions:  This study is compared with previous dated 06/10/2013:  Compared to the previous study, resting heart rate has changed from normal  sinus at ~ 72 bpm to sinus bradyc

## 2017-02-01 NOTE — PROGRESS NOTES
[facility]  Progress Note     Patient Status:  Inpatient    3/22/1920 MRN [id number]   [facility] 2NE-A Attending Jon Ambriz MD   [address] Day # 15 PCP Naveen Walsh MD     Assessment:      · Hypoxic/ Hypercar Probe Location: Right hand  Blood pressure 144/63, pulse 61, temperature 98 °F (36.7 °C), temperature source Oral, resp. rate 20, height 4' 11\" (1.499 m), weight 98 lb 5.2 oz (44.6 kg), SpO2 98 %.     Intake/Output:    Intake/Output Summary (Last 24 hours) (MIRALAX) powder packet 17 g 17 g Oral Daily PRN   magnesium hydroxide (MILK OF MAGNESIA) 400 MG/5ML suspension 30 mL 30 mL Oral Daily PRN   acetaminophen (TYLENOL) tab 650 mg 650 mg Oral Q6H PRN   TraMADol HCl (ULTRAM) tab 50 mg 50 mg Oral Q12H PRN   MULT

## 2017-02-01 NOTE — PLAN OF CARE
CARDIOVASCULAR - ADULT    • Maintains optimal cardiac output and hemodynamic stability Progressing        GENITOURINARY - ADULT    • Absence of urinary retention Progressing        METABOLIC/FLUID AND ELECTROLYTES - ADULT    • Electrolytes maintained withi

## 2017-02-02 LAB
ATRIAL RATE: 267 BPM
ATRIAL RATE: 278 BPM
BUN BLD-MCNC: 45 MG/DL (ref 8–20)
CALCIUM BLD-MCNC: 10.4 MG/DL (ref 8.3–10.3)
CHLORIDE: 93 MMOL/L (ref 101–111)
CO2: 32 MMOL/L (ref 22–32)
CREAT BLD-MCNC: 2.55 MG/DL (ref 0.55–1.02)
ERYTHROCYTE [DISTWIDTH] IN BLOOD BY AUTOMATED COUNT: 12.4 % (ref 11.5–16)
GLUCOSE BLD-MCNC: 92 MG/DL (ref 70–99)
HCT VFR BLD AUTO: 33.7 % (ref 34–50)
HGB BLD-MCNC: 11.2 G/DL (ref 12–16)
INR BLD: 0.9 (ref 0.89–1.12)
MCH RBC QN AUTO: 35.7 PG (ref 27–33.2)
MCHC RBC AUTO-ENTMCNC: 33.2 G/DL (ref 31–37)
MCV RBC AUTO: 107.3 FL (ref 81–100)
PLATELET # BLD AUTO: 146 10(3)UL (ref 150–450)
POTASSIUM SERPL-SCNC: 4.2 MMOL/L (ref 3.6–5.1)
PSA SERPL DL<=0.01 NG/ML-MCNC: 12.4 SECONDS (ref 12.3–14.8)
Q-T INTERVAL: 366 MS
Q-T INTERVAL: 372 MS
QRS DURATION: 100 MS
QRS DURATION: 100 MS
QTC CALCULATION (BEZET): 442 MS
QTC CALCULATION (BEZET): 457 MS
R AXIS: 36 DEGREES
R AXIS: 39 DEGREES
RBC # BLD AUTO: 3.14 X10(6)UL (ref 3.8–5.1)
RED CELL DISTRIBUTION WIDTH-SD: 49.5 FL (ref 35.1–46.3)
SODIUM SERPL-SCNC: 133 MMOL/L (ref 136–144)
T AXIS: 39 DEGREES
T AXIS: 42 DEGREES
VENTRICULAR RATE: 85 BPM
VENTRICULAR RATE: 94 BPM
WBC # BLD AUTO: 5.7 X10(3) UL (ref 4–13)

## 2017-02-02 PROCEDURE — 99233 SBSQ HOSP IP/OBS HIGH 50: CPT | Performed by: INTERNAL MEDICINE

## 2017-02-02 PROCEDURE — 99232 SBSQ HOSP IP/OBS MODERATE 35: CPT | Performed by: INTERNAL MEDICINE

## 2017-02-02 RX ORDER — HYDRALAZINE HYDROCHLORIDE 50 MG/1
50 TABLET, FILM COATED ORAL EVERY 8 HOURS SCHEDULED
Status: DISCONTINUED | OUTPATIENT
Start: 2017-02-02 | End: 2017-02-03

## 2017-02-02 RX ORDER — TORSEMIDE 20 MG/1
40 TABLET ORAL DAILY
Status: DISCONTINUED | OUTPATIENT
Start: 2017-02-03 | End: 2017-02-07

## 2017-02-02 RX ORDER — WARFARIN SODIUM 1 MG/1
1 TABLET ORAL NIGHTLY
Status: DISCONTINUED | OUTPATIENT
Start: 2017-02-02 | End: 2017-02-04

## 2017-02-02 RX ORDER — LOSARTAN POTASSIUM 25 MG/1
25 TABLET ORAL DAILY
Status: DISCONTINUED | OUTPATIENT
Start: 2017-02-02 | End: 2017-02-03

## 2017-02-02 NOTE — PROGRESS NOTES
[facility]  Progress Note     Kaiser Flores Patient Status:  Inpatient    3/22/1920 MRN [id number]   [facility] 2NE-A Attending Valdemar Berg MD   [facility] Day # 13 PCP Kamila Pineda MD     Chief Complaint: on HD now     S: Patie 5,000 Units Subcutaneous Q12H   • Levothyroxine Sodium  100 mcg Oral Nightly   • multivitamin  1 tablet Oral Daily   • Pantoprazole Sodium  40 mg Oral QAM AC   • Atorvastatin Calcium  10 mg Oral QPM   • prednisoLONE acetate  1 drop Right Eye Daily       AS

## 2017-02-02 NOTE — PLAN OF CARE
Problem: Patient/Family Goals  Goal: Patient/Family Short Term Goal  Patient’s Short Term Goal: to go home    Interventions:   - take medications as prescribed  - ambulate as tolerated  - encourage verbalization of needs/pain  - See additional Care Plan go

## 2017-02-02 NOTE — PROGRESS NOTES
[facility] Cardiology Progress Note    Geovany Du Patient Status:  Inpatient    3/22/1920 MRN [id number]   [facility] 2NE-A Attending Massiel Obregon MD   [address] Day # 15 PCP Francine Harrison MD     Subjective:  She i 1/19/17:  Conclusions:  1.  Left ventricle: There was sinus bradycardia with rates varying from 33 -      60 bpm. The cavity size was normal. Wall thickness was normal. Systolic      function was hyperdynamic with mid-cavity obliteration.  The estimated     effusion.   Impressions:  This study is compared with previous dated 06/10/2013:  Compared to the previous study, resting heart rate has changed from normal  sinus at ~ 72 bpm to sinus bradycardia with rates from 33 - 60 bpm. The  above noted left pleural e

## 2017-02-02 NOTE — PLAN OF CARE
I was notified by the RN that the patient went into Afib around 12:40 after HD. She has been off all AVN loretta blocking agents due to recently seen Mobitz type 1 on telemetry on this admission. I notified Dr. Oliver Archuleta; which has seen her previously.  Will

## 2017-02-02 NOTE — PROGRESS NOTES
[facility]  Nephrology Progress Note    Onofre Soriano Attending:  Raul Vo DO       Assessment and Plan:  1) ESRD- longstanding CKD due to hypertensive and age-related nephrosclerosis now with refractory fluid overload- tolerating HD #2 today 45 02/02/2017    02/02/2017   K 4.2 02/02/2017   CL 93 02/02/2017   CO2 32.0 02/02/2017   GLU 92 02/02/2017   CA 10.4 02/02/2017       Imaging: All imaging studies reviewed.     Meds:     Current Facility-Administered Medications:  hydrALAzine HCl (A

## 2017-02-02 NOTE — PLAN OF CARE
Problem: Patient/Family Goals  Goal: Patient/Family Long Term Goal  Patient’s Long Term Goal: return to baseline ADL’s    Interventions:  - take medications as prescribed  - ambulate as tolerated  - encourage verbalization of needs/pain  - See additional C

## 2017-02-02 NOTE — CM/SW NOTE
SW met with patient and patient's daughter regarding discharge plans. Patient's daughter would like for patient to go to [facility] at discharge.  SW informed by patient's daughter that [facility] only provides HD on MWF at their facility, will need to inquire with nephr

## 2017-02-02 NOTE — PROGRESS NOTES
[facility]  Progress Note    Juan Mario Patient Status:  Inpatient    3/22/1920 MRN [id number]   [facility] 2NE-A Attending Lucrecia Yu MD   [facility] Day # 15 PCP Evi Seay MD     Subjective:  Juan Mario is a(n) 80 ye heart failure type ([facility] [address]     Hypoxia     At risk for falling     Unspecified essential hypertension     Hypothyroid     Essential hypertension     NEIDA (acute kidney injury) (HCC)     Bradycardia     Macrocytosis     Hypercapnia     CKD stage 4 due to type

## 2017-02-03 ENCOUNTER — APPOINTMENT (OUTPATIENT)
Dept: GENERAL RADIOLOGY | Facility: HOSPITAL | Age: 82
DRG: 291 | End: 2017-02-03
Attending: INTERNAL MEDICINE
Payer: MEDICARE

## 2017-02-03 LAB
APTT PPP: 41.2 SECONDS (ref 25–34)
APTT PPP: 73.1 SECONDS (ref 25–34)
BASOPHILS # BLD AUTO: 0.01 X10(3) UL (ref 0–0.1)
BASOPHILS NFR BLD AUTO: 0.2 %
BUN BLD-MCNC: 26 MG/DL (ref 8–20)
CALCIUM BLD-MCNC: 9.6 MG/DL (ref 8.3–10.3)
CHLORIDE: 96 MMOL/L (ref 101–111)
CO2: 25 MMOL/L (ref 22–32)
CREAT BLD-MCNC: 2.23 MG/DL (ref 0.55–1.02)
EOSINOPHIL # BLD AUTO: 0.06 X10(3) UL (ref 0–0.3)
EOSINOPHIL NFR BLD AUTO: 1.2 %
ERYTHROCYTE [DISTWIDTH] IN BLOOD BY AUTOMATED COUNT: 12.4 % (ref 11.5–16)
ERYTHROCYTE [DISTWIDTH] IN BLOOD BY AUTOMATED COUNT: 12.5 % (ref 11.5–16)
GLUCOSE BLD-MCNC: 136 MG/DL (ref 70–99)
HCT VFR BLD AUTO: 32.9 % (ref 34–50)
HCT VFR BLD AUTO: 36.1 % (ref 34–50)
HGB BLD-MCNC: 10.8 G/DL (ref 12–16)
HGB BLD-MCNC: 11.6 G/DL (ref 12–16)
IMMATURE GRANULOCYTE COUNT: 0.02 X10(3) UL (ref 0–1)
IMMATURE GRANULOCYTE RATIO %: 0.4 %
LYMPHOCYTES # BLD AUTO: 0.22 X10(3) UL (ref 0.9–4)
LYMPHOCYTES NFR BLD AUTO: 4.5 %
MCH RBC QN AUTO: 35.5 PG (ref 27–33.2)
MCH RBC QN AUTO: 35.6 PG (ref 27–33.2)
MCHC RBC AUTO-ENTMCNC: 32.1 G/DL (ref 31–37)
MCHC RBC AUTO-ENTMCNC: 32.8 G/DL (ref 31–37)
MCV RBC AUTO: 108.6 FL (ref 81–100)
MCV RBC AUTO: 110.4 FL (ref 81–100)
MONOCYTES # BLD AUTO: 0.79 X10(3) UL (ref 0.1–0.6)
MONOCYTES NFR BLD AUTO: 16.1 %
NEUTROPHIL ABS PRELIM: 3.81 X10 (3) UL (ref 1.3–6.7)
NEUTROPHILS # BLD AUTO: 3.81 X10(3) UL (ref 1.3–6.7)
NEUTROPHILS NFR BLD AUTO: 77.6 %
PLATELET # BLD AUTO: 124 10(3)UL (ref 150–450)
PLATELET # BLD AUTO: 135 10(3)UL (ref 150–450)
POTASSIUM SERPL-SCNC: 4.2 MMOL/L (ref 3.6–5.1)
RBC # BLD AUTO: 3.03 X10(6)UL (ref 3.8–5.1)
RBC # BLD AUTO: 3.27 X10(6)UL (ref 3.8–5.1)
RED CELL DISTRIBUTION WIDTH-SD: 48.9 FL (ref 35.1–46.3)
RED CELL DISTRIBUTION WIDTH-SD: 51 FL (ref 35.1–46.3)
SODIUM SERPL-SCNC: 130 MMOL/L (ref 136–144)
WBC # BLD AUTO: 4.9 X10(3) UL (ref 4–13)
WBC # BLD AUTO: 5.3 X10(3) UL (ref 4–13)

## 2017-02-03 PROCEDURE — 71010 XR CHEST AP PORTABLE  (CPT=71010): CPT

## 2017-02-03 PROCEDURE — 99232 SBSQ HOSP IP/OBS MODERATE 35: CPT | Performed by: INTERNAL MEDICINE

## 2017-02-03 PROCEDURE — 99233 SBSQ HOSP IP/OBS HIGH 50: CPT | Performed by: INTERNAL MEDICINE

## 2017-02-03 RX ORDER — HEPARIN SODIUM AND DEXTROSE 10000; 5 [USP'U]/100ML; G/100ML
INJECTION INTRAVENOUS CONTINUOUS
Status: DISCONTINUED | OUTPATIENT
Start: 2017-02-03 | End: 2017-02-07

## 2017-02-03 RX ORDER — HEPARIN SODIUM AND DEXTROSE 10000; 5 [USP'U]/100ML; G/100ML
12 INJECTION INTRAVENOUS ONCE
Status: COMPLETED | OUTPATIENT
Start: 2017-02-03 | End: 2017-02-03

## 2017-02-03 RX ORDER — LOSARTAN POTASSIUM 25 MG/1
12.5 TABLET ORAL DAILY
Status: DISCONTINUED | OUTPATIENT
Start: 2017-02-04 | End: 2017-02-03

## 2017-02-03 RX ORDER — TROLAMINE SALICYLATE 10 G/100G
CREAM TOPICAL AS NEEDED
Status: DISCONTINUED | OUTPATIENT
Start: 2017-02-03 | End: 2017-02-07

## 2017-02-03 RX ORDER — HYDRALAZINE HYDROCHLORIDE 25 MG/1
25 TABLET, FILM COATED ORAL EVERY 8 HOURS SCHEDULED
Status: DISCONTINUED | OUTPATIENT
Start: 2017-02-03 | End: 2017-02-03

## 2017-02-03 NOTE — PROGRESS NOTES
RN notified me of a blood pressure of 64/42 manual and her heart rates are irregular in the 60's. She is asymptomatic. Will give 250cc bolus and recheck blood pressure.  Will hold Hydralazine 50mg every 8 hrs, Metoprolol 12.5mg daily, and Losartan 25mg jermaine

## 2017-02-03 NOTE — PROGRESS NOTES
[facility]  Nephrology Progress Note    Verba Null Attending:  Austin Vann DO       Assessment and Plan:  1) ESRD- longstanding CKD due to hypertensive and age-related nephrosclerosis now with refractory fluid overload- tolerated HD x 2.  Daug 02/02/2017       Imaging: All imaging studies reviewed.     Meds:     Current Facility-Administered Medications:  torsemide (DEMADEX) tab 40 mg 40 mg Oral Daily   hydrALAzine HCl (APRESOLINE) tab 50 mg 50 mg Oral Q8H [address]   Losartan Potassium (COZAAR) tab 2

## 2017-02-03 NOTE — PROGRESS NOTES
Patient seen and examined. Daughter at bedsided    This is an elderly 81 y/o female that was admitted with failure to thrive and diastolic CHF. Also has renal insufficiency and was recently started on hemodialysis by nephrology.   While on tele has been h

## 2017-02-03 NOTE — PLAN OF CARE
Walked in the [place] about 150ft, with 3 liters O2  Tele shows afib Hr 120- 130's while walking  At rest 100-110, denies chest pain and palpitations, states a little tired  Dr Crys Torres notified of increased heart rate with ambulation, no further orders

## 2017-02-03 NOTE — PROGRESS NOTES
[facility]  Progress Note     Augustinenishi Pugh Patient Status:  Inpatient    3/22/1920 MRN [id number]   [facility] 2NE-A Attending Nia Harkins MD   [facility] Day # 12 PCP Concha June MD     Chief Complaint: AFIB     S: Patient we mg Oral Nightly   • Heparin Lock Flush  1.5 mL Intravenous Once   • MULTIGEN FOLIC  1 tablet Oral BID   • Levothyroxine Sodium  100 mcg Oral Nightly   • multivitamin  1 tablet Oral Daily   • Pantoprazole Sodium  40 mg Oral QAM AC   • Atorvastatin Calcium

## 2017-02-03 NOTE — PROGRESS NOTES
[facility]  Progress Note    Jannet [place] Patient Status:  Inpatient    3/22/1920 MRN [id number]   [facility] 2NE-A Attending Jonas Peterson MD   [address] Day # 16 PCP Shirley Blankenship MD     Subjective:  Reji Means shoulders.  =====  CONCLUSION:     1. Compared to 1/24/17 chest radiograph, placement of right central venous catheter with improvement of bibasilar opacities. No new opacity appreciated.          1/19 ECHO:  Conclusions:  1.  Left ventricle:  There was sin There was mild-moderate      regurgitation. 10. Pulmonary arteries: Systolic pressure was markedly increased, estimated      to be 65mm Hg. 11. Pericardium, extracardiac: There was a left pleural effusion.   Impressions:  This study is compared with previ Daily   hydrALAzine HCl (APRESOLINE) injection 10 mg 10 mg Intravenous Q4H PRN       Assessment and Plan:  Patient Active Problem List:     Bacteremia     Closed bimalleolar fracture     Ulcer of other part of foot     Acute on chronic congestive heart karla

## 2017-02-03 NOTE — PLAN OF CARE
CARDIOVASCULAR - ADULT    • Maintains optimal cardiac output and hemodynamic stability Progressing    • Absence of cardiac arrhythmias or at baseline Progressing          Impaired Activities of Daily Living    • Achieve highest/safest level of independence

## 2017-02-04 LAB
APTT PPP: 53.8 SECONDS (ref 25–34)
BASOPHILS # BLD AUTO: 0.01 X10(3) UL (ref 0–0.1)
BASOPHILS NFR BLD AUTO: 0.2 %
BUN BLD-MCNC: 41 MG/DL (ref 8–20)
CALCIUM BLD-MCNC: 9.3 MG/DL (ref 8.3–10.3)
CHLORIDE: 94 MMOL/L (ref 101–111)
CO2: 31 MMOL/L (ref 22–32)
CREAT BLD-MCNC: 2.67 MG/DL (ref 0.55–1.02)
EOSINOPHIL # BLD AUTO: 0.1 X10(3) UL (ref 0–0.3)
EOSINOPHIL NFR BLD AUTO: 1.6 %
ERYTHROCYTE [DISTWIDTH] IN BLOOD BY AUTOMATED COUNT: 12.2 % (ref 11.5–16)
GLUCOSE BLD-MCNC: 92 MG/DL (ref 70–99)
HCT VFR BLD AUTO: 31.7 % (ref 34–50)
HGB BLD-MCNC: 10.6 G/DL (ref 12–16)
IMMATURE GRANULOCYTE COUNT: 0.02 X10(3) UL (ref 0–1)
IMMATURE GRANULOCYTE RATIO %: 0.3 %
INR BLD: 1.04 (ref 0.89–1.12)
LYMPHOCYTES # BLD AUTO: 0.28 X10(3) UL (ref 0.9–4)
LYMPHOCYTES NFR BLD AUTO: 4.4 %
MCH RBC QN AUTO: 35.8 PG (ref 27–33.2)
MCHC RBC AUTO-ENTMCNC: 33.4 G/DL (ref 31–37)
MCV RBC AUTO: 107.1 FL (ref 81–100)
MONOCYTES # BLD AUTO: 0.8 X10(3) UL (ref 0.1–0.6)
MONOCYTES NFR BLD AUTO: 12.7 %
NEUTROPHIL ABS PRELIM: 5.09 X10 (3) UL (ref 1.3–6.7)
NEUTROPHILS # BLD AUTO: 5.09 X10(3) UL (ref 1.3–6.7)
NEUTROPHILS NFR BLD AUTO: 80.8 %
PLATELET # BLD AUTO: 134 10(3)UL (ref 150–450)
POTASSIUM SERPL-SCNC: 4.1 MMOL/L (ref 3.6–5.1)
PSA SERPL DL<=0.01 NG/ML-MCNC: 13.9 SECONDS (ref 12.3–14.8)
RBC # BLD AUTO: 2.96 X10(6)UL (ref 3.8–5.1)
RED CELL DISTRIBUTION WIDTH-SD: 47.8 FL (ref 35.1–46.3)
SODIUM SERPL-SCNC: 132 MMOL/L (ref 136–144)
WBC # BLD AUTO: 6.3 X10(3) UL (ref 4–13)

## 2017-02-04 PROCEDURE — 99232 SBSQ HOSP IP/OBS MODERATE 35: CPT | Performed by: INTERNAL MEDICINE

## 2017-02-04 PROCEDURE — 99233 SBSQ HOSP IP/OBS HIGH 50: CPT | Performed by: INTERNAL MEDICINE

## 2017-02-04 RX ORDER — HEPARIN SODIUM 1000 [USP'U]/ML
5000 INJECTION, SOLUTION INTRAVENOUS; SUBCUTANEOUS ONCE
Status: DISCONTINUED | OUTPATIENT
Start: 2017-02-04 | End: 2017-02-04

## 2017-02-04 RX ORDER — HEPARIN SODIUM 1000 [USP'U]/ML
2000 INJECTION, SOLUTION INTRAVENOUS; SUBCUTANEOUS ONCE
Status: COMPLETED | OUTPATIENT
Start: 2017-02-04 | End: 2017-02-04

## 2017-02-04 RX ORDER — WARFARIN SODIUM 2 MG/1
2 TABLET ORAL NIGHTLY
Status: DISCONTINUED | OUTPATIENT
Start: 2017-02-04 | End: 2017-02-05

## 2017-02-04 NOTE — PROGRESS NOTES
[facility]  Nephrology Progress Note    Cheryl Severino Attending:  Rosa Anderson DO       Assessment and Plan:  1) ESRD- longstanding CKD due to hypertensive and age-related nephrosclerosis now with refractory fluid overload- tolerated HD x 2.  Daug  02/04/2017   K 4.1 02/04/2017   CL 94 02/04/2017   CO2 31.0 02/04/2017   GLU 92 02/04/2017   CA 9.3 02/04/2017   PTT 53.8 02/04/2017   INR 1.04 02/04/2017   PTP 13.9 02/04/2017       Imaging: All imaging studies reviewed.     Meds:     Current Fac

## 2017-02-04 NOTE — PROGRESS NOTES
[facility]  Progress Note    Femi Rizzo Patient Status:  Inpatient    3/22/1920 MRN [id number]   [facility] 2NE-A Attending Jenaro Corcoran MD   [facility] Day # 16 PCP Trevor Davila MD     Subjective:  Femi Rizzo is a(n) 80 ye (chronic kidney disease)     Acute on chronic congestive heart failure, unspecified congestive heart failure type ([facility] [address]     Hypoxia     At risk for falling     Unspecified essential hypertension     Hypothyroid     Essential hypertension     NEIDA (acute kid

## 2017-02-04 NOTE — PROGRESS NOTES
[facility] Cardiology Progress Note  Subjective:  Up in chair, no dyspnea.     Objective:  Afebrile 115/46  86/50   AFib 70s   I/O - 1135     BUN/cr 41/2.67  INR 1.04  PTT 53.8  Hgb 10.6    Neuro:awake/alert  HEENT:noJVD, moist mucosa  Cardiac:S1 S2 irreg

## 2017-02-04 NOTE — PLAN OF CARE
Rochelle LOPEZ notified of patient's blood pressure of 73/29: checked pressure three times. Was asked to recheck manually.

## 2017-02-04 NOTE — PLAN OF CARE
CARDIOVASCULAR - ADULT    • Maintains optimal cardiac output and hemodynamic stability Progressing    • Absence of cardiac arrhythmias or at baseline Progressing          Patient/Family Goals    • Patient/Family Long Term Goal Progressing    • Patient/Fami

## 2017-02-04 NOTE — PLAN OF CARE
Pt. Is alert and oriented to person, place and situation. Daughter at bedside. Lungs clear on auscultation. O2 at 3 liters nasal cannula. Pt. Is in afib on monitor.

## 2017-02-05 LAB
APTT PPP: 55.7 SECONDS (ref 25–34)
ATRIAL RATE: 264 BPM
ERYTHROCYTE [DISTWIDTH] IN BLOOD BY AUTOMATED COUNT: 12.1 % (ref 11.5–16)
HCT VFR BLD AUTO: 31.9 % (ref 34–50)
HGB BLD-MCNC: 10.4 G/DL (ref 12–16)
INR BLD: 1.13 (ref 0.89–1.12)
MCH RBC QN AUTO: 35.5 PG (ref 27–33.2)
MCHC RBC AUTO-ENTMCNC: 32.6 G/DL (ref 31–37)
MCV RBC AUTO: 108.9 FL (ref 81–100)
PLATELET # BLD AUTO: 149 10(3)UL (ref 150–450)
PSA SERPL DL<=0.01 NG/ML-MCNC: 14.9 SECONDS (ref 12.3–14.8)
Q-T INTERVAL: 348 MS
QRS DURATION: 92 MS
QTC CALCULATION (BEZET): 462 MS
R AXIS: 31 DEGREES
RBC # BLD AUTO: 2.93 X10(6)UL (ref 3.8–5.1)
RED CELL DISTRIBUTION WIDTH-SD: 48.5 FL (ref 35.1–46.3)
T AXIS: 83 DEGREES
VENTRICULAR RATE: 106 BPM
WBC # BLD AUTO: 6.2 X10(3) UL (ref 4–13)

## 2017-02-05 PROCEDURE — 99233 SBSQ HOSP IP/OBS HIGH 50: CPT | Performed by: INTERNAL MEDICINE

## 2017-02-05 PROCEDURE — 99232 SBSQ HOSP IP/OBS MODERATE 35: CPT | Performed by: INTERNAL MEDICINE

## 2017-02-05 RX ORDER — WARFARIN SODIUM 5 MG/1
5 TABLET ORAL
Status: COMPLETED | OUTPATIENT
Start: 2017-02-05 | End: 2017-02-05

## 2017-02-05 NOTE — PLAN OF CARE
SKIN/TISSUE INTEGRITY - ADULT    • Skin integrity remains intact Not Progressing          CARDIOVASCULAR - ADULT    • Maintains optimal cardiac output and hemodynamic stability Progressing    • Absence of cardiac arrhythmias or at baseline Progressing cushion utilized and waffle mattress brought from home. Turns encouraged. Fall precautions. Patient sleeping between cares. Daughter present at bedside. RN will continue to monitor.

## 2017-02-05 NOTE — PROGRESS NOTES
[facility]  Progress Note     Elijah Yi Patient Status:  Inpatient    3/22/1920 MRN [id number]   [facility] 2NE-A Attending Cristina Choe MD   [facility] Day # 16 PCP Kimani Calloway MD     Chief Complaint: Afib    S: Patient ove tablet Oral BID   • Levothyroxine Sodium  100 mcg Oral Nightly   • multivitamin  1 tablet Oral Daily   • Pantoprazole Sodium  40 mg Oral QAM AC   • Atorvastatin Calcium  10 mg Oral QPM   • prednisoLONE acetate  1 drop Right Eye Daily       ASSESSMENT / DELISA

## 2017-02-05 NOTE — PROGRESS NOTES
[facility]  Progress Note     Bo Proctor Patient Status:  Inpatient    3/22/1920 MRN [id number]   [facility] 2NE-A Attending Kevon Monzon MD   [address] Day # 25 PCP Adolfo Vaughn MD     Chief Complaint: Afib    S: Patient ove Once   • MULTIGEN FOLIC  1 tablet Oral BID   • Levothyroxine Sodium  100 mcg Oral Nightly   • multivitamin  1 tablet Oral Daily   • Pantoprazole Sodium  40 mg Oral QAM AC   • Atorvastatin Calcium  10 mg Oral QPM   • prednisoLONE acetate  1 drop Right Eye D

## 2017-02-05 NOTE — PLAN OF CARE
Hand off at bedside  Pt HR at 120-130s, pt up in chair asymptomatic  Denies CP, SOB, dizziness  Placed back to bed, EKG done, shows aflutter   Cards paged  Endorsed to [facility] RN

## 2017-02-05 NOTE — PROGRESS NOTES
[facility] Cardiology Progress Note  Subjective:  Sleeping soundly.   Reviewed with daughter    Objective:  Afebrile 131/52   AFib 70s   I/O - 740   INR 1.13  Hgb 10.4    Neuro:awake/alert  HEENT:noJVD  Cardiac:S1 S2 irregular w/ 2/6 systolic murmur  Lung

## 2017-02-05 NOTE — PROGRESS NOTES
CARDIOVASCULAR - ADULT      •  Maintains optimal cardiac output and hemodynamic stability  Progressing      •  Absence of cardiac arrhythmias or at baseline  Progressing                Patient/Family Goals      •  Patient/Family Long Term Goal  Progressing

## 2017-02-05 NOTE — PROGRESS NOTES
[facility]  Nephrology Progress Note    Yolette Hickey Attending:  Lester Cortez DO       Assessment and Plan:  1) ESRD- longstanding CKD due to hypertensive and age-related nephrosclerosis now with refractory fluid overload- tolerated HD x 3. Daug 02/05/2017   HGB 10.4 02/05/2017   HCT 31.9 02/05/2017   .0 02/05/2017   PTT 55.7 02/05/2017   INR 1.13 02/05/2017   PTP 14.9 02/05/2017       Imaging: All imaging studies reviewed.     Meds:     Current Facility-Administered Medications:  Warfarin

## 2017-02-06 ENCOUNTER — PRIOR ORIGINAL RECORDS (OUTPATIENT)
Dept: OTHER | Age: 82
End: 2017-02-06

## 2017-02-06 LAB
APTT PPP: 59.2 SECONDS (ref 25–34)
BUN BLD-MCNC: 53 MG/DL (ref 8–20)
CALCIUM BLD-MCNC: 9.5 MG/DL (ref 8.3–10.3)
CHLORIDE: 95 MMOL/L (ref 101–111)
CO2: 28 MMOL/L (ref 22–32)
CREAT BLD-MCNC: 2.96 MG/DL (ref 0.55–1.02)
ERYTHROCYTE [DISTWIDTH] IN BLOOD BY AUTOMATED COUNT: 12.1 % (ref 11.5–16)
GLUCOSE BLD-MCNC: 86 MG/DL (ref 70–99)
HBV CORE AB SERPL QL IA: NONREACTIVE
HBV SURFACE AB SER QL: NONREACTIVE
HBV SURFACE AB SERPL IA-ACNC: <3.1 MIU/ML
HCT VFR BLD AUTO: 30.8 % (ref 34–50)
HGB BLD-MCNC: 10.4 G/DL (ref 12–16)
INR BLD: 1.76 (ref 0.89–1.12)
MCH RBC QN AUTO: 36.1 PG (ref 27–33.2)
MCHC RBC AUTO-ENTMCNC: 33.8 G/DL (ref 31–37)
MCV RBC AUTO: 106.9 FL (ref 81–100)
PLATELET # BLD AUTO: 173 10(3)UL (ref 150–450)
POTASSIUM SERPL-SCNC: 4.1 MMOL/L (ref 3.6–5.1)
PSA SERPL DL<=0.01 NG/ML-MCNC: 21.1 SECONDS (ref 12.3–14.8)
RBC # BLD AUTO: 2.88 X10(6)UL (ref 3.8–5.1)
RED CELL DISTRIBUTION WIDTH-SD: 47.8 FL (ref 35.1–46.3)
SODIUM SERPL-SCNC: 130 MMOL/L (ref 136–144)
WBC # BLD AUTO: 5.5 X10(3) UL (ref 4–13)

## 2017-02-06 PROCEDURE — 99232 SBSQ HOSP IP/OBS MODERATE 35: CPT | Performed by: INTERNAL MEDICINE

## 2017-02-06 RX ORDER — HEPARIN SODIUM 1000 [USP'U]/ML
2000 INJECTION, SOLUTION INTRAVENOUS; SUBCUTANEOUS
Status: DISCONTINUED | OUTPATIENT
Start: 2017-02-06 | End: 2017-02-07

## 2017-02-06 NOTE — PROGRESS NOTES
[facility]  Nephrology Progress Note    Cheryl Severino Attending:  Rosa Anderson DO       Assessment and Plan:  1) ESRD- longstanding CKD due to hypertensive and age-related nephrosclerosis now with refractory fluid overload- tolerated HD x 3. Daug HGB 10.4 02/06/2017   HCT 30.8 02/06/2017   .0 02/06/2017   CREATSERUM 2.96 02/06/2017   BUN 53 02/06/2017    02/06/2017   K 4.1 02/06/2017   CL 95 02/06/2017   CO2 28.0 02/06/2017   GLU 86 02/06/2017   CA 9.5 02/06/2017   PTT 59.2 02/06/201

## 2017-02-06 NOTE — PROGRESS NOTES
[facility]  Cardiology Progress Note    Gera Foster Patient Status:  Inpatient    3/22/1920 MRN [id number]   [facility] 2NE-A Attending Jenny Chavez MD   [facility] Day # 23 PCP Helena Segal MD     Subjective:  Sleeping comfortabl Assessment:    · CHF with preserved LV, moderate TR, PH, volume management with dialysis   · ESRD, new on HD  · Afib, new this admission. On Heparin/coumadin. HR controlled on low dose lopressor.  Dr. Patria Mcclain may consider Amdiodarone if she converts   · Bra

## 2017-02-06 NOTE — PROGRESS NOTES
[facility]  Progress Note     Verba Null Patient Status:  Inpatient    3/22/1920 MRN [id number]   [facility] 2NE-A Attending Zackery Stevens MD   [facility] Day # 23 PCP Wilmer Briseno MD     Chief Complaint: Afib    S: Patient in Oral Daily   • Pantoprazole Sodium  40 mg Oral QAM AC   • Atorvastatin Calcium  10 mg Oral QPM   • prednisoLONE acetate  1 drop Right Eye Daily       ASSESSMENT / PLAN:     1. Atrial fibrillation with RVR- rate controlled now    1.  On heparin drip and coum

## 2017-02-06 NOTE — PLAN OF CARE
Patient/Family Goals    • Patient/Family Long Term Goal Progressing    • Patient/Family Short Term Goal Progressing          CARDIOVASCULAR - ADULT    • Absence of cardiac arrhythmias or at baseline Progressing          RESPIRATORY - ADULT    • Achieves op

## 2017-02-06 NOTE — PLAN OF CARE
Assumed patient care at 1130. Patient a&ox4. Vital signs stable- Afebrile. Afib on tele. On coumadin- dosing per cardiology. 1L NC, . No c/o pain. Heparin gtt infusing at 500U/Hr. Right IJ Permacath c/d/i. Plan for dialysis this afternoon.  Patient and d

## 2017-02-07 VITALS
HEIGHT: 59 IN | HEART RATE: 68 BPM | SYSTOLIC BLOOD PRESSURE: 130 MMHG | TEMPERATURE: 98 F | DIASTOLIC BLOOD PRESSURE: 62 MMHG | WEIGHT: 101.19 LBS | RESPIRATION RATE: 18 BRPM | OXYGEN SATURATION: 94 % | BODY MASS INDEX: 20.4 KG/M2

## 2017-02-07 LAB
APTT PPP: 72.4 SECONDS (ref 25–34)
INR BLD: 2.73 (ref 0.89–1.12)
PSA SERPL DL<=0.01 NG/ML-MCNC: 30 SECONDS (ref 12.3–14.8)

## 2017-02-07 PROCEDURE — 99239 HOSP IP/OBS DSCHRG MGMT >30: CPT | Performed by: INTERNAL MEDICINE

## 2017-02-07 PROCEDURE — 99232 SBSQ HOSP IP/OBS MODERATE 35: CPT | Performed by: INTERNAL MEDICINE

## 2017-02-07 RX ORDER — TORSEMIDE 20 MG/1
40 TABLET ORAL DAILY
Qty: 60 TABLET | Refills: 0 | Status: SHIPPED | OUTPATIENT
Start: 2017-02-07 | End: 2017-03-09

## 2017-02-07 NOTE — PLAN OF CARE
Problem: CARDIOVASCULAR - ADULT  Goal: Maintains optimal cardiac output and hemodynamic stability  INTERVENTIONS:  - Monitor vital signs, rhythm, and trends  - Monitor for bleeding, hypotension and signs of decreased cardiac output  - Evaluate effectivenes imbalances  - Administer electrolyte replacement as ordered  - Monitor response to electrolyte replacements, including rhythm and repeat lab results as appropriate  - Fluid restriction as ordered  - Instruct patient on fluid and nutrition restrictions as a strengthening/mobility  - Encourage toileting schedule   Outcome: Progressing    Comments:   Received bedside report on this Pt. At 1540. Pt. Awake, A&Ox1-2, forgetful, calm, pleasant and cooperative, daughter at bedside.   AFib on Tele monitor, sats great

## 2017-02-07 NOTE — PROGRESS NOTES
[facility]  Nephrology Progress Note    Pierre Quan Attending:  Carol Corey DO       Assessment and Plan:  1) ESRD- longstanding CKD due to hypertensive and age-related nephrosclerosis now with refractory fluid overload- tolerated HD x 3. Daug Imaging: All imaging studies reviewed.     Meds:     Current Facility-Administered Medications:  Heparin Sodium (Porcine) 1000 UNIT/ML injection 2,000 Units 2,000 Units Intravenous PRN Dialysis   metoprolol tartrate (LOPRESSOR) partial tablet 6.25 mg

## 2017-02-07 NOTE — DISCHARGE SUMMARY
[facility]  DISCHARGE SUMMARY     Femi Rizzo Patient Status:  Inpatient    3/22/1920 MRN [id number]   [facility] 2NE-A Attending Jenaro Corcoran MD   Hosp Day # 21 PCP Trevor Davila MD     Date of Admission: 2017  Date Patient's  Daughter had bronchitis and cold also recently. Patient denies any associated chest pain.   Complains of mild shortness of breath aggravated with activity.  Noted to be hypoxic in the emergency room.  Complains of Constipation and has bowel move given:  100 mcg on 2/6/2017 11:30 PM   Commonly known as:  SYNTHROID        Take 100 mcg by mouth nightly. Refills:  0       Multi Vitamin/Minerals Tabs        Take 1 tablet by mouth daily.     Refills:  0       MULTIGEN FOLIC -0-5 MG Tabs   Last t [16-20] 18  BP: (101-147)/(41-74) 130/62 mmHg    Physical Exam:    General: No acute distress. Respiratory: Clear to auscultation bilaterally. No wheezes. No rhonchi. Cardiovascular: S1, S2. Regular rate and rhythm. No murmurs, rubs or gallops.    Abdome

## 2017-02-07 NOTE — PLAN OF CARE
CARDIOVASCULAR - ADULT    • Maintains optimal cardiac output and hemodynamic stability Progressing    • Absence of cardiac arrhythmias or at baseline Progressing        **Afib on tele, controlled. VSS. Denies any CP, SOB, palpitations, or dizziness. RN at [employer]

## 2017-02-07 NOTE — PROGRESS NOTES
[facility]  Progress Note    Zeeshan Moreno Patient Status:  Inpatient    3/22/1920 MRN [id number]   [facility] 2NE-A Attending Tylor Fernández MD   Hosp Day # 21 PCP Nicolasa Muñoz MD     Assessment:      · Hypoxic/ Hypercarbic Res per 24 hour   Intake    560 ml   Output   1400 ml   Net   -840 ml     Wt Readings from Last 6 Encounters:  02/07/17 : 101 lb 3.2 oz (45.904 kg)  01/05/15 : 128 lb (58.06 kg)  11/09/14 : 129 lb (58.514 kg)  10/04/13 : 143 lb (64.864 kg)  09/20/13 : 123 lb ( -2-1 MG TABS 1 tablet-Patient supplied 1 tablet Oral BID   influenza vaccine (PF)(FLUZONE) high dose for 65 yrs & older nj 0.5ml 0.5 mL Intramuscular Prior to discharge   Levothyroxine Sodium (SYNTHROID) tab 100 mcg 100 mcg Oral Nightly   multivitami

## 2017-02-07 NOTE — PLAN OF CARE
CARDIOVASCULAR - ADULT    • Maintains optimal cardiac output and hemodynamic stability Progressing    • Absence of cardiac arrhythmias or at baseline Progressing        Impaired Functional Mobility    • Achieve highest/safest level of mobility/gait Progres

## 2017-02-07 NOTE — PROGRESS NOTES
NURSING DISCHARGE NOTE    Discharged Rehab facility via Ambulance. Accompanied by Family member and Support staff  Belongings Taken by patient/family. Pt alert, slightly forgetful, daughter at bedside.  Discharge instructions and medications given t

## 2017-02-07 NOTE — CM/SW NOTE
REESE informed by RN that patient is cleared for discharged today. REESE spoke with Dequan Rice, liaison from [facility] who confirmed they can accept patient today. REESE set up [facility] for 2:00 PM.  PCS completed, copy placed on chart.   RN and family notifi

## 2017-02-07 NOTE — PROGRESS NOTES
[facility]  Progress Note    Oksana Galicia Patient Status:  Inpatient    3/22/1920 MRN [id number]   [facility] 2NE-A Attending Barak Curtis MD   [facility] Day # 23 PCP Bety Gonzales MD       Assessment:      · Hypoxic/ Hypercarb ml   Output    725 ml   Net   -665 ml     Wt Readings from Last 6 Encounters:  02/06/17 : 104 lb 15 oz (47.6 kg)  01/05/15 : 128 lb (58.06 kg)  11/09/14 : 129 lb (58.514 kg)  10/04/13 : 143 lb (64.864 kg)  09/20/13 : 123 lb (55.792 kg)  07/02/13 : 144 lb 9 HCl (ULTRAM) tab 50 mg 50 mg Oral Q12H PRN   MULTIGEN FOLIC -2-2 MG TABS 1 tablet-Patient supplied 1 tablet Oral BID   influenza vaccine (PF)(FLUZONE) high dose for 65 yrs & older nj 0.5ml 0.5 mL Intramuscular Prior to discharge   Levothyroxine Sodiu

## 2017-02-07 NOTE — PROGRESS NOTES
[facility]  Cardiology Progress Note    Geovany Du Patient Status:  Inpatient    3/22/1920 MRN [id number]   [facility] 2NE-A Attending Keon Cho MD   [address] Day # 21 PCP Francine Harrison MD     Subjective:  Up in the chair thi lopressor. Dr. Kristopher Sicard may consider Amdiodarone if she converts.  INR up to 2.76 today from 1.76 and no coumadin given  · Bradycardia-no significant pauses, tolerating low dose lopressor    · HTN- overall controlled   · Respiratory failure, multifactorial, rest

## 2017-02-08 ENCOUNTER — TELEPHONE (OUTPATIENT)
Dept: CARDIOLOGY UNIT | Facility: HOSPITAL | Age: 82
End: 2017-02-08

## 2017-02-08 ENCOUNTER — SNF VISIT (OUTPATIENT)
Dept: INTERNAL MEDICINE CLINIC | Age: 82
End: 2017-02-08

## 2017-02-08 VITALS
DIASTOLIC BLOOD PRESSURE: 67 MMHG | OXYGEN SATURATION: 98 % | HEART RATE: 90 BPM | SYSTOLIC BLOOD PRESSURE: 119 MMHG | RESPIRATION RATE: 18 BRPM | TEMPERATURE: 98 F

## 2017-02-08 DIAGNOSIS — R09.02 HYPOXIA: ICD-10-CM

## 2017-02-08 DIAGNOSIS — D63.1 ANEMIA IN CHRONIC KIDNEY DISEASE(285.21): ICD-10-CM

## 2017-02-08 DIAGNOSIS — E87.70 HYPERVOLEMIA, UNSPECIFIED HYPERVOLEMIA TYPE: ICD-10-CM

## 2017-02-08 DIAGNOSIS — I51.89 RESTRICTIVE HEART DISEASE: ICD-10-CM

## 2017-02-08 DIAGNOSIS — N18.9 ANEMIA IN CHRONIC KIDNEY DISEASE(285.21): ICD-10-CM

## 2017-02-08 DIAGNOSIS — N18.6 ESRD (END STAGE RENAL DISEASE) (HCC): Primary | ICD-10-CM

## 2017-02-08 DIAGNOSIS — Z79.01 LONG TERM CURRENT USE OF ANTICOAGULANTS WITH INR GOAL OF 2.0-3.0: ICD-10-CM

## 2017-02-08 DIAGNOSIS — I48.19 PERSISTENT ATRIAL FIBRILLATION (HCC): ICD-10-CM

## 2017-02-08 DIAGNOSIS — R53.1 WEAKNESS: ICD-10-CM

## 2017-02-08 DIAGNOSIS — M53.3 COCCYX PAIN: ICD-10-CM

## 2017-02-08 PROCEDURE — 99310 SBSQ NF CARE HIGH MDM 45: CPT | Performed by: NURSE PRACTITIONER

## 2017-02-08 RX ORDER — TRAMADOL HYDROCHLORIDE 50 MG/1
25 TABLET ORAL EVERY 8 HOURS PRN
COMMUNITY
End: 2017-02-10

## 2017-02-08 NOTE — PROGRESS NOTES
Gera Grahamin  : 3/22/1920  Age 80year old  female patient is admitted to [address] [place] [address] date:  17  Discharge date to [facility]:  17  ELOS:  approx 10 days:   Anticipated discharge • CKD (chronic kidney disease)    • High cholesterol    • High blood pressure    • Disorder of thyroid    • Cataract    • Muscle weakness          Past Surgical History    HYSTERECTOMY      KNEE REPLACEMENT SURGERY      Comment B/L    APPENDECTOMY (Oral)  Resp 18  SpO2 98%     REVIEW OF SYSTEMS:  GENERAL HEALTH:feels well otherwise  SKIN: denies any unusual skin lesions or rashes  WOUNDS: bilat tib/buttock   EYES:no visual complaints or deficits  HENT: denies nasal congestion, sinus pain or sore thr ln/c: increased to upright and  2 ln/c: O2sat up to 93%/no distress/no dyspnea   CARDIOVASCULAR: S1, S2 normal, RRR; no S3, no S4; , no click irregular rhythm today noted 2/6 murmur auscultated  ABDOMEN:  normal active BS+, soft, nondistended; no organomeg as tolerated  5. Torsemide 40mg po daily  6. Daily weights: notify if > 2lb /day   7. HD to manage anemia associated with CKD  8.  RT to work with patient with incentive cecelia/acappella 2/2 pulm congestion    afib with coumadin for anticoag  1. 1mg comadin

## 2017-02-08 NOTE — CM/SW NOTE
02/08/17 0800   Discharge disposition   Discharged to: Skilled Nurs   Name of [id number]   Patient assessed for rehabilitation services?  Yes   Patient is Discharged to a [address] [place] [address] Yes   Discharge transportatio

## 2017-02-08 NOTE — PROGRESS NOTES
HF RN Coordinator spoke with nurse taking care of Merrick De Paz at [facility] and [address] [place] [address]. According to RN, he stated that daily INR's will be drawn by them and the APN at [facility] will manage that as well. He will follow up with the APN there.   Current

## 2017-02-10 ENCOUNTER — SNF VISIT (OUTPATIENT)
Dept: INTERNAL MEDICINE CLINIC | Age: 82
End: 2017-02-10

## 2017-02-10 VITALS
BODY MASS INDEX: 21 KG/M2 | TEMPERATURE: 98 F | DIASTOLIC BLOOD PRESSURE: 78 MMHG | RESPIRATION RATE: 18 BRPM | OXYGEN SATURATION: 96 % | WEIGHT: 105 LBS | SYSTOLIC BLOOD PRESSURE: 118 MMHG | HEART RATE: 80 BPM

## 2017-02-10 DIAGNOSIS — Z79.01 LONG TERM CURRENT USE OF ANTICOAGULANTS WITH INR GOAL OF 2.0-3.0: ICD-10-CM

## 2017-02-10 DIAGNOSIS — M53.3 COCCYX PAIN: ICD-10-CM

## 2017-02-10 DIAGNOSIS — R05.9 COUGH: Primary | ICD-10-CM

## 2017-02-10 PROCEDURE — 99309 SBSQ NF CARE MODERATE MDM 30: CPT | Performed by: NURSE PRACTITIONER

## 2017-02-10 RX ORDER — WARFARIN SODIUM 1 MG/1
1.5 TABLET ORAL NIGHTLY
COMMUNITY
End: 2017-02-27 | Stop reason: DRUGHIGH

## 2017-02-10 NOTE — PROGRESS NOTES
Augustine Pugh, 3/22/1920, 80year old, female    Chief Complaint:  Patient presents with:   Follow - Up: INR: at 1.7 today/patient c/o coccyx pain while sitting during HD session/coughing while taking  pills       Subjective:    Patient seen in f/u to he deferred  RESPIRATORY:clear to auscultation fine rales in bases bilat   CARDIOVASCULAR: S1, S2 normal, RRR; no S3, no S4; , no click irregular rhythm noted 2/6 murmur auscultated  ABDOMEN:  normal active BS+, soft, nondistended; no organomegaly, no masses; and techniques  2. Monitor for worsening sx  3. duonebs tid x one week  4. RT to work with patient    Diastolic CHF with ESRD/CKD now on HD tx/opacities on CXR bibasilar: improving prior to DC  1. 1800ml fluid restriction  2. HD [facility]  3.  Dr. Tracie Babinski to [facility]

## 2017-02-13 ENCOUNTER — SNF VISIT (OUTPATIENT)
Dept: INTERNAL MEDICINE CLINIC | Age: 82
End: 2017-02-13

## 2017-02-13 VITALS
HEART RATE: 80 BPM | SYSTOLIC BLOOD PRESSURE: 127 MMHG | BODY MASS INDEX: 21 KG/M2 | DIASTOLIC BLOOD PRESSURE: 72 MMHG | TEMPERATURE: 99 F | RESPIRATION RATE: 18 BRPM | WEIGHT: 101.81 LBS | OXYGEN SATURATION: 95 %

## 2017-02-13 DIAGNOSIS — N18.6 ESRD (END STAGE RENAL DISEASE) (HCC): ICD-10-CM

## 2017-02-13 DIAGNOSIS — R05.9 COUGH: Primary | ICD-10-CM

## 2017-02-13 PROCEDURE — 99308 SBSQ NF CARE LOW MDM 20: CPT | Performed by: NURSE PRACTITIONER

## 2017-02-13 NOTE — PROGRESS NOTES
Rubi Junior, 3/22/1920, 80year old, female    Chief Complaint:  Patient presents with:   Follow - Up: patient oxygen requirement is only at night per nursing/patient doing HD today/no c/o except neck pain and tailbone pain        Subjective:    Patien normal active BS+, soft, nondistended; no organomegaly, no masses; no bruits; nontender, no guarding, no rebound tenderness.   :no suprapubic distension and no CVA tenderness  LYMPHATIC:no lymphedema  MUSCULOSKELETAL: no acute synovitis upper or lower ext week  3. Metoprolol 6.25mg po bid  4. F/u with [facility] upon dc for management of afib/coumadin    hld  1. Statin as ordered    Hypothyroid  1. levothryroxine 100mcg po daily    Wounds/pain in coccyx  1.  Tylenol for pain: daughter refused to use the Tramadol 2

## 2017-02-15 ENCOUNTER — SNF VISIT (OUTPATIENT)
Dept: INTERNAL MEDICINE CLINIC | Age: 82
End: 2017-02-15

## 2017-02-15 VITALS
HEART RATE: 70 BPM | SYSTOLIC BLOOD PRESSURE: 122 MMHG | OXYGEN SATURATION: 95 % | DIASTOLIC BLOOD PRESSURE: 70 MMHG | RESPIRATION RATE: 20 BRPM | TEMPERATURE: 98 F

## 2017-02-15 DIAGNOSIS — N18.6 ESRD (END STAGE RENAL DISEASE) (HCC): Primary | ICD-10-CM

## 2017-02-15 PROCEDURE — 99308 SBSQ NF CARE LOW MDM 20: CPT | Performed by: NURSE PRACTITIONER

## 2017-02-16 NOTE — PROGRESS NOTES
Noel Morton, 3/22/1920, 80year old, female    Chief Complaint:  Patient presents with:   Follow - Up: preparing to dc on friday: walking 100feet CGA/patient with [facility] 5 a.m./due for 2/17/17: PT/INR       Subjective:    Patient seen in f/u to her edwa pulses 2+ no cyanosis/clubbing to BUE/BLE  1+edema to BLE from  tib to feet  Pink/warm extrems  - homans bilat  NEUROLOGIC: intact; no sensorimotor deficit, cranial nerves intact II-XII, follows commands  PSYCHIATRIC: drowsy but arouseable/up in HD oriente in coccyx  1. Tylenol for pain: daughter refused to use the Tramadol 25mg every 8 hours prn and  prior to HD for pain in coccyx  2. Wound care to coccyx and legs    Weakness/debility/chronic shoulder pain/degen  1. PT/OT at [facility] level  2.  Transition to home

## 2017-02-27 ENCOUNTER — TELEPHONE (OUTPATIENT)
Dept: NEPHROLOGY | Facility: CLINIC | Age: 82
End: 2017-02-27

## 2017-03-03 ENCOUNTER — TELEPHONE (OUTPATIENT)
Dept: NEPHROLOGY | Facility: CLINIC | Age: 82
End: 2017-03-03

## 2017-03-07 ENCOUNTER — PRIOR ORIGINAL RECORDS (OUTPATIENT)
Dept: OTHER | Age: 82
End: 2017-03-07

## 2017-03-09 LAB
ALBUMIN: 3.4 G/DL
BUN: 53 MG/DL
CALCIUM: 9.5 MG/DL
CALCIUM: 9.6 MG/DL
CHLORIDE: 95 MEQ/L
CREATININE, SERUM: 2.96 MG/DL
GLUCOSE: 86 MG/DL
HEMATOCRIT: 30.8 %
HEMOGLOBIN: 10.4 G/DL
HEMOGLOBIN: 10.9 G/DL
PLATELETS: 173 K/UL
POTASSIUM, SERUM: 4.1 MEQ/L
POTASSIUM, SERUM: 4.1 MEQ/L
RED BLOOD COUNT: 2.88 X 10-6/U
SODIUM: 130 MEQ/L
WHITE BLOOD COUNT: 5.5 X 10-3/U

## 2017-04-19 ENCOUNTER — TELEPHONE (OUTPATIENT)
Dept: NEPHROLOGY | Facility: CLINIC | Age: 82
End: 2017-04-19

## 2017-04-19 ENCOUNTER — HOSPITAL ENCOUNTER (OUTPATIENT)
Dept: LAB | Facility: HOSPITAL | Age: 82
Discharge: HOME | End: 2017-04-19
Attending: INTERNAL MEDICINE
Payer: MEDICARE

## 2017-04-19 PROCEDURE — 85610 PROTHROMBIN TIME: CPT | Performed by: INTERNAL MEDICINE

## 2017-04-21 PROBLEM — I10 UNSPECIFIED ESSENTIAL HYPERTENSION: Chronic | Status: RESOLVED | Noted: 2017-01-18 | Resolved: 2017-04-21

## 2017-04-21 PROBLEM — E03.9 HYPOTHYROID: Chronic | Status: RESOLVED | Noted: 2017-01-18 | Resolved: 2017-04-21

## 2017-04-24 ENCOUNTER — PRIOR ORIGINAL RECORDS (OUTPATIENT)
Dept: OTHER | Age: 82
End: 2017-04-24

## 2017-04-26 ENCOUNTER — HOSPITAL ENCOUNTER (OUTPATIENT)
Dept: LAB | Facility: HOSPITAL | Age: 82
Discharge: HOME | End: 2017-04-26
Attending: INTERNAL MEDICINE
Payer: MEDICARE

## 2017-04-26 DIAGNOSIS — I48.91 ATRIAL FIBRILLATION (HCC): ICD-10-CM

## 2017-04-26 PROCEDURE — 85610 PROTHROMBIN TIME: CPT

## 2017-04-28 ENCOUNTER — HOSPITAL ENCOUNTER (OUTPATIENT)
Dept: LAB | Facility: HOSPITAL | Age: 82
Discharge: HOME | End: 2017-04-28
Attending: INTERNAL MEDICINE
Payer: MEDICARE

## 2017-04-28 ENCOUNTER — PRIOR ORIGINAL RECORDS (OUTPATIENT)
Dept: OTHER | Age: 82
End: 2017-04-28

## 2017-04-28 PROCEDURE — 36415 COLL VENOUS BLD VENIPUNCTURE: CPT | Performed by: INTERNAL MEDICINE

## 2017-04-28 PROCEDURE — 80061 LIPID PANEL: CPT | Performed by: INTERNAL MEDICINE

## 2017-04-28 PROCEDURE — 80053 COMPREHEN METABOLIC PANEL: CPT | Performed by: INTERNAL MEDICINE

## 2017-04-28 PROCEDURE — 83880 ASSAY OF NATRIURETIC PEPTIDE: CPT | Performed by: INTERNAL MEDICINE

## 2017-04-28 PROCEDURE — 84443 ASSAY THYROID STIM HORMONE: CPT | Performed by: INTERNAL MEDICINE

## 2017-04-28 PROCEDURE — 85025 COMPLETE CBC W/AUTO DIFF WBC: CPT | Performed by: INTERNAL MEDICINE

## 2017-05-02 ENCOUNTER — PRIOR ORIGINAL RECORDS (OUTPATIENT)
Dept: OTHER | Age: 82
End: 2017-05-02

## 2017-05-04 ENCOUNTER — PRIOR ORIGINAL RECORDS (OUTPATIENT)
Dept: OTHER | Age: 82
End: 2017-05-04

## 2017-05-08 ENCOUNTER — PRIOR ORIGINAL RECORDS (OUTPATIENT)
Dept: OTHER | Age: 82
End: 2017-05-08

## 2017-05-08 LAB
ALBUMIN: 3.5 G/DL
ALKALINE PHOSPHATATE(ALK PHOS): 116 IU/L
BILIRUBIN TOTAL: 0.5 MG/DL
BNP: 360 PMOL/L
BUN: 34 MG/DL
CALCIUM: 10 MG/DL
CHLORIDE: 100 MEQ/L
CHOLESTEROL, TOTAL: 164 MG/DL
CREATININE, SERUM: 2.01 MG/DL
GLUCOSE: 89 MG/DL
HDL CHOLESTEROL: 92 MG/DL
HEMATOCRIT: 36.5 %
HEMOGLOBIN: 11.8 G/DL
LDL CHOLESTEROL: 51 MG/DL
PLATELETS: 277 K/UL
POTASSIUM, SERUM: 4.1 MEQ/L
PROTEIN, TOTAL: 7.6 G/DL
RED BLOOD COUNT: 3.34 X 10-6/U
SGOT (AST): 18 IU/L
SGPT (ALT): 18 IU/L
SODIUM: 137 MEQ/L
THYROID STIMULATING HORMONE: 2.72 MLU/L
TRIGLYCERIDES: 106 MG/DL
WHITE BLOOD COUNT: 6.1 X 10-3/U

## 2017-05-09 ENCOUNTER — TELEPHONE (OUTPATIENT)
Dept: NEPHROLOGY | Facility: CLINIC | Age: 82
End: 2017-05-09

## 2017-05-10 ENCOUNTER — HOSPITAL ENCOUNTER (OUTPATIENT)
Dept: LAB | Facility: HOSPITAL | Age: 82
Discharge: HOME | End: 2017-05-10
Attending: INTERNAL MEDICINE
Payer: MEDICARE

## 2017-05-10 DIAGNOSIS — I48.91 ATRIAL FIBRILLATION (HCC): ICD-10-CM

## 2017-05-10 PROCEDURE — 85610 PROTHROMBIN TIME: CPT

## 2017-05-12 ENCOUNTER — TELEPHONE (OUTPATIENT)
Dept: NEPHROLOGY | Facility: CLINIC | Age: 82
End: 2017-05-12

## 2017-05-16 ENCOUNTER — TELEPHONE (OUTPATIENT)
Dept: NEPHROLOGY | Facility: CLINIC | Age: 82
End: 2017-05-16

## 2017-05-17 ENCOUNTER — PRIOR ORIGINAL RECORDS (OUTPATIENT)
Dept: OTHER | Age: 82
End: 2017-05-17

## 2017-05-26 ENCOUNTER — PRIOR ORIGINAL RECORDS (OUTPATIENT)
Dept: OTHER | Age: 82
End: 2017-05-26

## 2017-05-30 ENCOUNTER — PRIOR ORIGINAL RECORDS (OUTPATIENT)
Dept: OTHER | Age: 82
End: 2017-05-30

## 2019-03-01 VITALS
HEIGHT: 57 IN | HEART RATE: 66 BPM | SYSTOLIC BLOOD PRESSURE: 110 MMHG | BODY MASS INDEX: 22.44 KG/M2 | DIASTOLIC BLOOD PRESSURE: 80 MMHG | OXYGEN SATURATION: 93 % | WEIGHT: 104 LBS

## 2019-03-01 VITALS
WEIGHT: 104 LBS | HEIGHT: 58 IN | BODY MASS INDEX: 21.83 KG/M2 | DIASTOLIC BLOOD PRESSURE: 60 MMHG | SYSTOLIC BLOOD PRESSURE: 149 MMHG | HEART RATE: 86 BPM

## 2023-05-11 NOTE — PROGRESS NOTES
Addended by: CELINA ARGUETA on: 5/11/2023 10:48 AM     Modules accepted: Level of Service     [facility]  Progress Note    Yolette Ruperto Patient Status:  Inpatient    3/22/1920 MRN [id number]   [facility] 2NE-A Attending Estrada Hunt MD   [facility] Day # 25 PCP Omer Mendoza MD     Subjective:  Yolette Hickey is a(n) 80 ye CKD (chronic kidney disease)     Acute on chronic congestive heart failure, unspecified congestive heart failure type ([facility] [address]     Hypoxia     At risk for falling     Unspecified essential hypertension     Hypothyroid     Essential hypertension     NEIDA (acut

## (undated) NOTE — LETTER
Consent to Procedure/Sedation    Date: 01/30/2017   Time: 1614    1. I authorize the performance upon Diaz's the following:    HD permcath placmeent    2.  I authorize Dr. Nisha Briseno (and whomever is designated as the doctor’s assistant), to perform t Witness: ____________________     Date: ______________    Printed: 2017   4:13 PM    Patient Name: Yolette Hickey        : 3/22/1920       Medical Record #: [id number]

## (undated) NOTE — IP AVS SNAPSHOT
Patient Demographics     Address Phone    [address] [facility]  [facility] [phone] [facility])  [phone] [facility]      Emergency Contact(s)     Name Relation Home Work Zeeshan Talbot [phone] 2. Your recommended sodium intake is 0712-5111 mg daily    3. Limit your fluid intake to no more than 2 liters or 64 ounces per day    4. Some exercise and activity is important to help keep your heart functioning and strong.  Unless instructed not to exerc [address] [phone]           Your medication list      TAKE these medications        Instructions Authorizing Provider    Morning Afternoon Evening As Needed    Levothyroxine Sodium 100 MCG Tabs   Last time this was g Generic drug:  Diphenhydramine-APAP (sleep)   Next dose due:  2/7/17 Evening        Take 2 tablets by mouth nightly.                                  Where to Get Your Medications      Please  your prescriptions at the location directed by your docto Urine Culture 50,000 CFU/ML Escherichia coli (A)       80,000 CFU/ML Mixed gram positive manjit     Susceptibility      Escherichia coli     Not Specified    Ampicillin >=32  Resistant    Ampicillin + Sulbactam >=32  Resistant    Cefazolin <=4  Sensitive uses walker to ambulate. 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 Patient's  Daughter had bronchitis and cold also recently.   Patient den A comprehensive 14 point review of systems was completed. Pertinent positives and negatives noted in the the HPI. Physical Exam:     Vital signs: Blood pressure 177/66, pulse 54, temperature 99.2 °F (37.3 °C), temperature source Temporal, resp.  rate 25 1. Acute on chronic CHF exacerbation with Nausea, fatigue, lethargy with sleepiness and decreased appetite, pedal edema, generalized weakness,shortness of breath -IV Lasix, 2D echo, cardiology consult, oxygen, monitor on telemetry, CHF protocol  2.  Ana Laura Rivera Symptoms started 2-3 weeks back and progressively worsening per patient's daughter who is at bedside. Patient lives at home with her daughter and uses walker to ambulate.   Patient states she had some cold-like symptoms 2-3 weeks back and was blowing nose No Known Allergies    Home Medications:    (Not in a hospital admission)    Review of Systems:  A comprehensive 14 point review of systems was completed. Pertinent positives and negatives noted in the the HPI.     Physical Exam:     Vital signs: Blood pres #3. Hiatal hernia which appears to have increased since prior examination.        ASSESSMENT / PLAN:     1.  Acute on chronic CHF exacerbation with Nausea, fatigue, lethargy with sleepiness and decreased appetite, pedal edema, generalized weakness,shortness Ammon Avila is a a(n) 80year old female. History fo chf, ckd admitted to EDW 1/18 with two weeks worsening somnolence, malaise, shortness of breath and edema. Found to be edematous and hypoxic in ED and started on diuresis and admitted to the floor. • hydrALAzine HCl  25 mg Oral Q8H [address]     influenza virus vaccine PF, hydrALAzine HCl    Review of Systems:   Constitutional: per HPI  Eyes: Negative for visual disturbance, irritation and redness.   Ears, nose, mouth, throat, and face: Negative for hearing conjunctival injection/discharge, nares normal  MOUTH: MMM, good dentition  NECK: Trachea midline, symmetric, no visible masses or scars, no crepitus, normal flexion/extension  CHEST: Normal chest excursion, no visible deformity or scars, no tenderness to LEUUR  Negative   WBCUR  1-4   RBCUR  0-2   BACUR  None Seen   EPIUR  None Seen       Radiology:     Xr Chest Ap Portable  (cpt=71010)    1/18/2017  CONCLUSION: #1. Cardiomegaly with mild pulmonary vascular congestion.  #2. Small bilateral pleural effusions Mely French [facility]                                                               [phone] ---------------------------------------------------------------------------- Transthoracic Echocardiogram Name:Cami Wilkerson Date: 2017 : mild stenosis. Mild regurgitation. Peak velocity (S): 3.4m/sec     at a heart rate of ~ 38 bpm. Mean gradient (S): 25mm Hg. Valve area     (VTI): 1.34cm^2. Indexed valve area (VTI): 0.91cm^2/m^2. 4. Mitral valve: Moderately calcified annulus.  The basal as abnormal relaxation and increased filling pressure - grade 2 diastolic dysfunction. Ventricular septum:   The contour showed systolic flattening, c/w RV pressure overload. Left atrium:  The left atrial volume was markedly increased. Right ventricle:   The c markedly increased, estimated to be 65mm Hg. Systemic veins: Inferior vena cava: The vessel was at the upper limits of normal. The respirophasic diameter changes were blunted (less than 50%).  Atrial septum:  There was a patent foramen ovale. -------------- 3. 1   L/(min-m^2) ---------  Stroke index (SV/bsa), LVOT DP                 83    ml/m^2      ---------   Aortic valve                                   Value             Reference  Aortic valve peak velocity, S                  3.4   m/sec       - ---------  Mitral A-wave peak velocity                    1.5   m/sec       ---------  Mitral mean velocity, D                        1.23  m/sec       ---------  Mitral mean gradient, D                        6     mm Hg       ---------  Mitral peak grad Video Swallow Study Notes     No notes of this type exist for this encounter. SLP Notes     No notes of this type exist for this encounter.             Future Appointments        Provider Cielo Sands    4/5/2017 10:00 AM Belen Abebe MD [facility]

## (undated) NOTE — IP AVS SNAPSHOT
[facility] [place] [address] [place], [address] ~ [phone]                Discharge Summary   1/18/2017    Kaci Wilkerson           Admission Information        Provider Department    1/18/2017 Kyler Wolf MD  2ne-A Morning Afternoon Evening As Needed    Levothyroxine Sodium 100 MCG Tabs   Last time this was given:  100 mcg on 2/6/2017 11:30 PM   Commonly known as:  SYNTHROID   Next dose due:  2/7/17 Evening        Take 100 mcg by mouth nightly. Transfer to [facility] at [phone] upon discharge from rehab.      Recent coumadin dosing and INR's  2/3 Coumadin 1 mg- INR   2/4 Coumadin 2 mg- INR 1.04  2/5 Coumadin 5 mg- INR 1.13  2/6 NO coumadin- INR 1.76  2/7 NO coumadin- INR 2.7 you develop chest pain, lightheadedness, or significant shortness of breath.        Call Your Cardiologist If:   · You gain 2 pounds overnight or 3-4 pounds in 3-5 days  · You have more difficulty breathing  · You are getting more tired with normal activity Future Appointments     Apr 05, 2017 10:00 AM   FOLLOW UP with Nguyen Julian MD   [facility] ([address]) [address] [facility]  [address]   [phone]              Discharge Orders     Future Labs/Pr 0.8 (02/01/17)  0.3  (02/01/17)  5.09 (02/01/17)  0.18 (L) (02/01/17)  0.73 (H) (02/01/17)  0.05 (02/01/17)  5.93      Metabolic Lab Results  (Last result in the past 90 days)    HgbA1C Glucose BUN Creatinine Calcium Alkaline Phosph AST    -- (02/06/17)  8 visit,  view other health information, and more. To sign up or find more information, go to [email or web address] and click on the Sign Up Now link in the [employer] box.      Enter your [employer] Activation Code exactly as it appears below along with yo What to report to your healthcare team: Dizziness, decreased urine amount           Cholesterol Lowering Medications     simvastatin 20 MG Oral Tab       Use: Lower cholesterol, protect your heart   Most common side effects: Dizziness, constipation, abnorm